# Patient Record
Sex: MALE | Race: BLACK OR AFRICAN AMERICAN | NOT HISPANIC OR LATINO | Employment: UNEMPLOYED | URBAN - METROPOLITAN AREA
[De-identification: names, ages, dates, MRNs, and addresses within clinical notes are randomized per-mention and may not be internally consistent; named-entity substitution may affect disease eponyms.]

---

## 2023-03-13 LAB
ALANINE AMINOTRANSFERASE (SGPT) (U/L) IN SER/PLAS: NORMAL U/L (ref 10–52)
ALBUMIN (G/DL) IN SER/PLAS: NORMAL G/DL (ref 3.4–5)
ALBUMIN (MG/L) IN URINE: NORMAL
ALBUMIN/CREATININE (UG/MG) IN URINE: NORMAL
ALKALINE PHOSPHATASE (U/L) IN SER/PLAS: NORMAL U/L (ref 33–136)
ANION GAP IN SER/PLAS: NORMAL MMOL/L (ref 10–20)
ASPARTATE AMINOTRANSFERASE (SGOT) (U/L) IN SER/PLAS: NORMAL U/L (ref 9–39)
BASOPHILS (10*3/UL) IN BLOOD BY AUTOMATED COUNT: NORMAL
BASOPHILS/100 LEUKOCYTES IN BLOOD BY AUTOMATED COUNT: NORMAL
BILIRUBIN TOTAL (MG/DL) IN SER/PLAS: NORMAL
CALCIUM (MG/DL) IN SER/PLAS: NORMAL MG/DL (ref 8.6–10.6)
CARBON DIOXIDE, TOTAL (MMOL/L) IN SER/PLAS: NORMAL MMOL/L (ref 21–32)
CHLORIDE (MMOL/L) IN SER/PLAS: NORMAL MMOL/L (ref 98–107)
CHOLESTEROL (MG/DL) IN SER/PLAS: NORMAL MG/DL (ref 0–199)
CHOLESTEROL IN HDL (MG/DL) IN SER/PLAS: NORMAL MG/DL
CHOLESTEROL/HDL RATIO: NORMAL
COBALAMIN (VITAMIN B12) (PG/ML) IN SER/PLAS: NORMAL PG/ML (ref 211–911)
CREATININE (MG/DL) IN SER/PLAS: NORMAL MG/DL (ref 0.5–1.3)
CREATININE (MG/DL) IN URINE: NORMAL
EOSINOPHILS (10*3/UL) IN BLOOD BY AUTOMATED COUNT: NORMAL
EOSINOPHILS/100 LEUKOCYTES IN BLOOD BY AUTOMATED COUNT: NORMAL
ERYTHROCYTE DISTRIBUTION WIDTH (RATIO) BY AUTOMATED COUNT: NORMAL % (ref 11.5–14.5)
ERYTHROCYTE MEAN CORPUSCULAR HEMOGLOBIN CONCENTRATION (G/DL) BY AUTOMATED: NORMAL G/DL (ref 32–36)
ERYTHROCYTE MEAN CORPUSCULAR VOLUME (FL) BY AUTOMATED COUNT: NORMAL FL (ref 80–100)
ERYTHROCYTES (10*6/UL) IN BLOOD BY AUTOMATED COUNT: NORMAL
ESTIMATED AVERAGE GLUCOSE FOR HBA1C: NORMAL
GFR FEMALE: NORMAL ML/MIN/1.73M2
GFR MALE: NORMAL ML/MIN/1.73M2
GLUCOSE (MG/DL) IN SER/PLAS: NORMAL
HEMATOCRIT (%) IN BLOOD BY AUTOMATED COUNT: NORMAL
HEMOGLOBIN (G/DL) IN BLOOD: NORMAL
HEMOGLOBIN A1C/HEMOGLOBIN TOTAL IN BLOOD: NORMAL
HGB A1C-DATA CONVERSION: NORMAL %
IMMATURE GRANULOCYTES/100 LEUKOCYTES IN BLOOD BY AUTOMATED COUNT: NORMAL % (ref 0–0.9)
LDL: NORMAL MG/DL (ref 0–99)
LEUKOCYTES (10*3/UL) IN BLOOD BY AUTOMATED COUNT: NORMAL
LYMPHOCYTES (10*3/UL) IN BLOOD BY AUTOMATED COUNT: NORMAL
LYMPHOCYTES/100 LEUKOCYTES IN BLOOD BY AUTOMATED COUNT: NORMAL
MANUAL DIFFERENTIAL Y/N: NORMAL
MONOCYTES (10*3/UL) IN BLOOD BY AUTOMATED COUNT: NORMAL
MONOCYTES/100 LEUKOCYTES IN BLOOD BY AUTOMATED COUNT: NORMAL
NEUTROPHILS (10*3/UL) IN BLOOD BY AUTOMATED COUNT: NORMAL
NEUTROPHILS/100 LEUKOCYTES IN BLOOD BY AUTOMATED COUNT: NORMAL
NON HDL CHOLESTEROL: NORMAL MG/DL
NRBC (PER 100 WBCS) BY AUTOMATED COUNT: NORMAL /100 WBC (ref 0–0)
PLATELETS (10*3/UL) IN BLOOD AUTOMATED COUNT: NORMAL
POTASSIUM (MMOL/L) IN SER/PLAS: NORMAL MMOL/L (ref 3.5–5.3)
PROSTATE SPECIFIC ANTIGEN,SCREEN: NORMAL NG/ML (ref 0–4)
PROTEIN TOTAL: NORMAL G/DL (ref 6.4–8.2)
SODIUM (MMOL/L) IN SER/PLAS: NORMAL MMOL/L (ref 136–145)
THYROTROPIN (MIU/L) IN SER/PLAS BY DETECTION LIMIT <= 0.05 MIU/L: NORMAL
TRIGLYCERIDE (MG/DL) IN SER/PLAS: NORMAL MG/DL (ref 0–149)
UREA NITROGEN (MG/DL) IN SER/PLAS: NORMAL
VLDL: NORMAL MG/DL (ref 0–40)

## 2023-03-18 PROBLEM — I10 UNCONTROLLED HYPERTENSION: Status: ACTIVE | Noted: 2023-03-18

## 2023-03-18 PROBLEM — F09 COGNITIVE DYSFUNCTION: Status: ACTIVE | Noted: 2023-03-18

## 2023-03-18 PROBLEM — R25.1 TREMOR: Status: ACTIVE | Noted: 2023-03-18

## 2023-03-18 PROBLEM — N18.9 CHRONIC RENAL INSUFFICIENCY: Status: ACTIVE | Noted: 2023-03-18

## 2023-03-18 PROBLEM — N40.0 ENLARGED PROSTATE: Status: ACTIVE | Noted: 2023-03-18

## 2023-03-18 PROBLEM — B35.1 MYCOTIC TOENAILS: Status: ACTIVE | Noted: 2023-03-18

## 2023-03-18 PROBLEM — K86.9 PANCREATIC DISORDER (HHS-HCC): Status: ACTIVE | Noted: 2023-03-18

## 2023-03-18 PROBLEM — M17.0 PRIMARY OSTEOARTHRITIS OF BOTH KNEES: Status: ACTIVE | Noted: 2023-03-18

## 2023-03-18 PROBLEM — M17.9 OSTEOARTHRITIS OF KNEE: Status: ACTIVE | Noted: 2023-03-18

## 2023-03-18 PROBLEM — R60.0 BILATERAL LOWER EXTREMITY EDEMA: Status: ACTIVE | Noted: 2023-03-18

## 2023-03-18 PROBLEM — R26.9 GAIT DISORDER: Status: ACTIVE | Noted: 2023-03-18

## 2023-03-18 PROBLEM — R41.3 MEMORY LOSS: Status: ACTIVE | Noted: 2023-03-18

## 2023-03-18 PROBLEM — I99.9 DECREASED CIRCULATION: Status: ACTIVE | Noted: 2023-03-18

## 2023-03-18 PROBLEM — I73.9 PVD (PERIPHERAL VASCULAR DISEASE) (CMS-HCC): Status: ACTIVE | Noted: 2023-03-18

## 2023-03-18 PROBLEM — E78.2 HYPERLIPEMIA, MIXED: Status: ACTIVE | Noted: 2023-03-18

## 2023-03-18 PROBLEM — E11.9 DIABETES MELLITUS TYPE 2, CONTROLLED (MULTI): Status: ACTIVE | Noted: 2023-03-18

## 2023-03-18 PROBLEM — R74.8 ELEVATED LIPASE: Status: ACTIVE | Noted: 2023-03-18

## 2023-03-18 PROBLEM — D64.9 CHRONIC ANEMIA: Status: ACTIVE | Noted: 2023-03-18

## 2023-03-18 PROBLEM — E78.5 DYSLIPIDEMIA: Status: ACTIVE | Noted: 2023-03-18

## 2023-03-18 PROBLEM — I10 BENIGN ESSENTIAL HYPERTENSION: Status: ACTIVE | Noted: 2023-03-18

## 2023-03-18 PROBLEM — I48.91 ATRIAL FIBRILLATION (MULTI): Status: ACTIVE | Noted: 2023-03-18

## 2023-03-18 PROBLEM — R29.898 BILATERAL LEG WEAKNESS: Status: ACTIVE | Noted: 2023-03-18

## 2023-03-18 PROBLEM — R26.2 DIFFICULTY WALKING: Status: ACTIVE | Noted: 2023-03-18

## 2023-03-18 PROBLEM — H91.93 HEARING LOSS, BILATERAL: Status: ACTIVE | Noted: 2023-03-18

## 2023-03-18 PROBLEM — R63.4 ABNORMAL WEIGHT LOSS: Status: ACTIVE | Noted: 2023-03-18

## 2023-03-18 PROBLEM — M54.50 LOW BACK PAIN: Status: ACTIVE | Noted: 2023-03-18

## 2023-03-18 RX ORDER — CHLORTHALIDONE 25 MG/1
1 TABLET ORAL EVERY MORNING
COMMUNITY
Start: 2018-11-29 | End: 2023-12-06

## 2023-03-18 RX ORDER — TAMSULOSIN HYDROCHLORIDE 0.4 MG/1
2 CAPSULE ORAL DAILY
COMMUNITY
Start: 2017-05-05 | End: 2023-07-07 | Stop reason: SDUPTHER

## 2023-03-18 RX ORDER — FUROSEMIDE 20 MG/1
1 TABLET ORAL DAILY
COMMUNITY
Start: 2020-11-10 | End: 2023-05-16 | Stop reason: SDUPTHER

## 2023-03-18 RX ORDER — BLOOD SUGAR DIAGNOSTIC
STRIP MISCELLANEOUS
COMMUNITY
Start: 2015-04-23

## 2023-03-18 RX ORDER — DILTIAZEM HYDROCHLORIDE 240 MG/1
1 CAPSULE, EXTENDED RELEASE ORAL DAILY
COMMUNITY
Start: 2017-11-09 | End: 2023-12-06 | Stop reason: SDUPTHER

## 2023-03-18 RX ORDER — POTASSIUM CHLORIDE 750 MG/1
1 TABLET, FILM COATED, EXTENDED RELEASE ORAL 2 TIMES DAILY
COMMUNITY
Start: 2022-11-14 | End: 2023-05-11 | Stop reason: SDUPTHER

## 2023-03-18 RX ORDER — METFORMIN HYDROCHLORIDE 1000 MG/1
1 TABLET ORAL 2 TIMES DAILY
COMMUNITY
Start: 2014-03-06 | End: 2023-11-11

## 2023-03-18 RX ORDER — GLIMEPIRIDE 4 MG/1
2 TABLET ORAL DAILY
COMMUNITY
Start: 2015-12-21 | End: 2023-10-07 | Stop reason: WASHOUT

## 2023-03-18 RX ORDER — ATORVASTATIN CALCIUM 40 MG/1
40 TABLET, FILM COATED ORAL NIGHTLY
COMMUNITY
Start: 2012-11-06 | End: 2023-12-06

## 2023-03-18 RX ORDER — LANCETS 33 GAUGE
EACH MISCELLANEOUS
COMMUNITY

## 2023-03-20 ENCOUNTER — TELEPHONE (OUTPATIENT)
Dept: PRIMARY CARE | Facility: CLINIC | Age: 83
End: 2023-03-20
Payer: MEDICARE

## 2023-03-20 ENCOUNTER — NURSING HOME VISIT (OUTPATIENT)
Dept: POST ACUTE CARE | Facility: EXTERNAL LOCATION | Age: 83
End: 2023-03-20
Payer: MEDICARE

## 2023-03-20 DIAGNOSIS — I5A NON-ISCHEMIC MYOCARDIAL INJURY (NON-TRAUMATIC): ICD-10-CM

## 2023-03-20 DIAGNOSIS — I10 HYPERTENSION, UNSPECIFIED TYPE: ICD-10-CM

## 2023-03-20 DIAGNOSIS — R53.81 PHYSICAL DEBILITY: Primary | ICD-10-CM

## 2023-03-20 DIAGNOSIS — N18.9 ACUTE KIDNEY INJURY SUPERIMPOSED ON CKD (CMS-HCC): ICD-10-CM

## 2023-03-20 DIAGNOSIS — G93.41 METABOLIC ENCEPHALOPATHY: ICD-10-CM

## 2023-03-20 DIAGNOSIS — E78.2 HYPERLIPEMIA, MIXED: ICD-10-CM

## 2023-03-20 DIAGNOSIS — N40.0 BENIGN PROSTATIC HYPERPLASIA, UNSPECIFIED WHETHER LOWER URINARY TRACT SYMPTOMS PRESENT: ICD-10-CM

## 2023-03-20 DIAGNOSIS — N17.9 ACUTE KIDNEY INJURY SUPERIMPOSED ON CKD (CMS-HCC): ICD-10-CM

## 2023-03-20 DIAGNOSIS — E11.29 CONTROLLED TYPE 2 DIABETES MELLITUS WITH OTHER DIABETIC KIDNEY COMPLICATION, WITHOUT LONG-TERM CURRENT USE OF INSULIN (MULTI): ICD-10-CM

## 2023-03-20 PROCEDURE — 99310 SBSQ NF CARE HIGH MDM 45: CPT | Performed by: NURSE PRACTITIONER

## 2023-03-20 NOTE — TELEPHONE ENCOUNTER
----- Message from Lala Pratt MD sent at 3/19/2023 11:59 PM EDT -----  The patient is due for a posthospitalization follow-up visit.  Please schedule the appointment.

## 2023-03-20 NOTE — LETTER
Patient: Betito Chahal  : 1940    Encounter Date: 2023    Subjective   Patient ID: Betito Chahal is a 83 y.o. male who is acute skilled care and presents for initial visit for skilled nursing.    HPI   A 83 year old Male with past medical history of dementia, Paroxysmal A-fib,hypertension, diastolic dysfunction, carotid stenosis, diabetes type 2, hyperlipidemia who was hospitalization due to altered mental status and fall. His worked up; Lactate as high as 6; troponin as high as 200 but this was felt likely 2/2 the RVR ; . Afib RVR HR in 140s. CXR with possible lower infiltrates and was treated with Lasix. He was given IV cardizem and IV fluids and HR and lactate improved.  During hospitalization; he found to have some dementia and confused required sitter. However prior discharged to SNF, he was sitter free for >24 hours and was pleasant. He discharged to  for rehab.     Family History:Family History: reviewed and not pertinent to presenting problem  Social History:Alcohol Use: denies, Drug Use:   denies       Allergies:·      No Known Allergies:    Review of Systems  A 10 point ROS was performed with the patient denying any complaints at this  time aside from those listed in the HPI above.  Objective   Vitals: T 98.0 - 3/20/2023 14:38 Route: Forehead (non-contact)  /80 - 3/20/2023 14:38 Position: Sitting r/arm  P 68 - 3/20/2023 14:38 Pulse Type: Regular  Character: Normal.  R 18 - 3/20/2023 14:38  O2 98 %- 3/20/2023 14:38 Method: Room Air      W 187.2 lb - 3/20/2023 11:33 Scale: Sitting    Physical Exam  Constitutional:       General: He is not in acute distress.     Appearance: Normal appearance. He is not ill-appearing.   HENT:      Head: Normocephalic and atraumatic.      Nose: Nose normal.      Mouth/Throat:      Mouth: Mucous membranes are moist.      Pharynx: Oropharynx is clear.   Eyes:      General: No scleral icterus.     Conjunctiva/sclera: Conjunctivae normal.      Pupils: Pupils  are equal, round, and reactive to light.   Cardiovascular:      Rate and Rhythm: Normal rate and regular rhythm.      Heart sounds: Normal heart sounds.   Pulmonary:      Effort: Pulmonary effort is normal. No respiratory distress.      Breath sounds: Normal breath sounds. No wheezing, rhonchi or rales.   Abdominal:      General: Abdomen is flat. There is no distension.      Palpations: Abdomen is soft.      Tenderness: There is no abdominal tenderness. There is no rebound.   Musculoskeletal:         General: No swelling. Normal range of motion.      Cervical back: Neck supple. No tenderness.   Skin:     General: Skin is warm and dry.   Neurological:      Mental Status: He is alert and oriented to person, place, and time. Mental status is at baseline.   Psychiatric:         Mood and Affect: Mood normal.         Behavior: Behavior normal.     Lab 3/20/23    CBC       White Blood Cell Count  6.58 k/uL 3.70-11.00  Final           RBC  4.70 m/uL 4.20-6.00  Final           Hemoglobin  11.3 g/dL 13.0-17.0 L Final           Hematocrit  37.7 % 39.0-51.0 L Final           MCV  80.2 fL 80.0-100.0  Final           MCH  24.0 pg 26.0-34.0 L Final           MCHC  30.0 g/dL 30.5-36.0 L Final           RDW-CV  18.7 % 11.5-15.0 H Final           Platelet Count  407 k/uL 150-400 H Final           MPV  11.5 fL 9.0-12.7  Final           Absolute nRBC  <0.01 k/uL <0.01  Final       Basic Metabolic Panl       Glucose  231 mg/dL 74-99 H Final      The American Diabetes Association (ADA) provides guidance for cutoff values for fasting glucose and random glucose. The ADA defines fasting as no caloric intake for at least 8 hours. Fasting plasma glucose results between 100 to 125 mg/dL indicate increased risk for diabetes (prediabetes).  Fasting plasma glucose results greater than or equal to 126 mg/dL meet the criteria for diagnosis of diabetes. In the absence of unequivocal hyperglycemia, results should be confirmed by repeat testing. In a  patient with classic symptoms of hyperglycemia or hyperglycemic crisis, random plasma glucose results greater than or equal to 200 mg/dL meet the criteria for diagnosis of diabetes.  Reference: Standards of Medical Care in Diabetes 2016, American Diabetes Association. Diabetes Care. 2016.39(Suppl 1).       BUN  30 mg/dL 9-24 H Final           Creatinine  1.95 mg/dL 0.73-1.22 H Final           Sodium  137 mmol/L 136-144  Final           Potassium  4.2 mmol/L 3.7-5.1  Final           Chloride  99 mmol/L   Final           CO2  23 mmol/L 22-30  Final           Anion Gap  15 mmol/L 9-18  Final           Calcium, Total  9.4 mg/dL 8.5-10.2  Final           Estimated Glomerular Filtration Rate  34 mL/min/1.73 meters squared >=60 L Final      Estimated Glomerular Filtration Rate (eGFR) is calculated using the 2021 CKD-EPI creatinine equation. This equation utilizes serum creatinine, sex, and age as parameters. The creatinine assay has traceable calibration to isotope dilution-mass spectrometry. Refer to KDIGO guidelines for clinical interpretation. In patients with unstable renal function, e.g. those with acute kidney injury, the eGFR may not accurately reflect actual GFR.  Assessment/Plan   #Physical debility  - Continue PT/OT   # Metabolic encephalopathy; Mental status improves  - mild Dementia with delirium; mental status improves   -Likely secondary to infection  -Continue encourage PO intake  - Fall precaution  # Sepsis; resolved   # Pneumonia; resolved,   -Chest x-ray consistent with right lower lobe pneumonia  -He was treated with ceftriaxone and azithromycin  -Monitor weekly cbc   # Hyperglycemia  # DM type 2-Blood glucose elevated likely due to sepsis  -resumed metformin 1000 mg PO BID  -C/W Glimepiride Oral Tablet 8 MG PO daily   - c/w SITagliptin 100 mg PO daily   -Diabetic diet  -Will start Insulin sliding scale and Accu-Cheks  # Non-ischemic myocardial injury  - Elevated troponins  -EKG with no acute  ischemic changes  -Troponin elevation likely from demand ischemia  -Trend troponins until downtrend  # SILVIA on CKD stage 3   -Cr 1.68 improved after IV fluids  - Current CR elevated 1.95 with BUN 34 on 3/20/23  - Will encourage PO fluid for now given hx of fluid overloaded   # A fib  -continue home meds;  dilTIAZem HCl ER  240 MG PO daily  - c/w eliquis 5 mg PO BID  - Continue monitor HR/BP   #HTN  - c/w Furosemide 40 mg PO daily   - Continue monitor weekly BMP   # HLD  -continue Atorvastatin 40 mg Po daily   #BPH  - c/w Flomax 0.8 mg PO daily   #Med reviewed  #Labs reviewed    Time spending for 45 minutes   -reviewed of hospital record via EMR and reconciled medication in [facility] and EMR (d/c summary). Reviewed orders, medications, records, and pertinent labs/x-rays from [facility]. Reviewed VS, BS, O2, etc as per protocol. Reviewed and signed off orders, medications, Labs, x-rays, and current diagnoses in [facility]  -Obtained history  -Performing physical examination  -Counseling and educating patient   -Ordering medications, lab   -Documenting clinical information in the [facility]  -Care coordinating with nursing staff;      Electronically Signed By: LILLIAN Sweeney-CNP   3/21/23  1:04 PM

## 2023-03-21 PROBLEM — N17.9 ACUTE KIDNEY INJURY SUPERIMPOSED ON CKD (CMS-HCC): Status: ACTIVE | Noted: 2023-03-21

## 2023-03-21 PROBLEM — G93.41 METABOLIC ENCEPHALOPATHY: Status: ACTIVE | Noted: 2023-03-21

## 2023-03-21 PROBLEM — R53.81 PHYSICAL DEBILITY: Status: ACTIVE | Noted: 2023-03-21

## 2023-03-21 PROBLEM — I5A NON-ISCHEMIC MYOCARDIAL INJURY (NON-TRAUMATIC): Status: ACTIVE | Noted: 2023-03-21

## 2023-03-21 PROBLEM — N18.9 ACUTE KIDNEY INJURY SUPERIMPOSED ON CKD (CMS-HCC): Status: ACTIVE | Noted: 2023-03-21

## 2023-03-21 NOTE — PROGRESS NOTES
Subjective   Patient ID: Betito Chahal is a 83 y.o. male who is acute skilled care and presents for initial visit for skilled nursing.    HPI   A 83 year old Male with past medical history of dementia, Paroxysmal A-fib,hypertension, diastolic dysfunction, carotid stenosis, diabetes type 2, hyperlipidemia who was hospitalization due to altered mental status and fall. His worked up; Lactate as high as 6; troponin as high as 200 but this was felt likely 2/2 the RVR ; . Afib RVR HR in 140s. CXR with possible lower infiltrates and was treated with Lasix. He was given IV cardizem and IV fluids and HR and lactate improved.  During hospitalization; he found to have some dementia and confused required sitter. However prior discharged to SNF, he was sitter free for >24 hours and was pleasant. He discharged to  for rehab.     Family History:Family History: reviewed and not pertinent to presenting problem  Social History:Alcohol Use: denies, Drug Use:   denies       Allergies:·      No Known Allergies:    Review of Systems  A 10 point ROS was performed with the patient denying any complaints at this  time aside from those listed in the HPI above.  Objective   Vitals: T 98.0 - 3/20/2023 14:38 Route: Forehead (non-contact)  /80 - 3/20/2023 14:38 Position: Sitting r/arm  P 68 - 3/20/2023 14:38 Pulse Type: Regular  Character: Normal.  R 18 - 3/20/2023 14:38  O2 98 %- 3/20/2023 14:38 Method: Room Air      W 187.2 lb - 3/20/2023 11:33 Scale: Sitting    Physical Exam  Constitutional:       General: He is not in acute distress.     Appearance: Normal appearance. He is not ill-appearing.   HENT:      Head: Normocephalic and atraumatic.      Nose: Nose normal.      Mouth/Throat:      Mouth: Mucous membranes are moist.      Pharynx: Oropharynx is clear.   Eyes:      General: No scleral icterus.     Conjunctiva/sclera: Conjunctivae normal.      Pupils: Pupils are equal, round, and reactive to light.   Cardiovascular:      Rate  and Rhythm: Normal rate and regular rhythm.      Heart sounds: Normal heart sounds.   Pulmonary:      Effort: Pulmonary effort is normal. No respiratory distress.      Breath sounds: Normal breath sounds. No wheezing, rhonchi or rales.   Abdominal:      General: Abdomen is flat. There is no distension.      Palpations: Abdomen is soft.      Tenderness: There is no abdominal tenderness. There is no rebound.   Musculoskeletal:         General: No swelling. Normal range of motion.      Cervical back: Neck supple. No tenderness.   Skin:     General: Skin is warm and dry.   Neurological:      Mental Status: He is alert and oriented to person, place, and time. Mental status is at baseline.   Psychiatric:         Mood and Affect: Mood normal.         Behavior: Behavior normal.     Lab 3/20/23    CBC       White Blood Cell Count  6.58 k/uL 3.70-11.00  Final           RBC  4.70 m/uL 4.20-6.00  Final           Hemoglobin  11.3 g/dL 13.0-17.0 L Final           Hematocrit  37.7 % 39.0-51.0 L Final           MCV  80.2 fL 80.0-100.0  Final           MCH  24.0 pg 26.0-34.0 L Final           MCHC  30.0 g/dL 30.5-36.0 L Final           RDW-CV  18.7 % 11.5-15.0 H Final           Platelet Count  407 k/uL 150-400 H Final           MPV  11.5 fL 9.0-12.7  Final           Absolute nRBC  <0.01 k/uL <0.01  Final       Basic Metabolic Panl       Glucose  231 mg/dL 74-99 H Final      The American Diabetes Association (ADA) provides guidance for cutoff values for fasting glucose and random glucose. The ADA defines fasting as no caloric intake for at least 8 hours. Fasting plasma glucose results between 100 to 125 mg/dL indicate increased risk for diabetes (prediabetes).  Fasting plasma glucose results greater than or equal to 126 mg/dL meet the criteria for diagnosis of diabetes. In the absence of unequivocal hyperglycemia, results should be confirmed by repeat testing. In a patient with classic symptoms of hyperglycemia or hyperglycemic  crisis, random plasma glucose results greater than or equal to 200 mg/dL meet the criteria for diagnosis of diabetes.  Reference: Standards of Medical Care in Diabetes 2016, American Diabetes Association. Diabetes Care. 2016.39(Suppl 1).       BUN  30 mg/dL 9-24 H Final           Creatinine  1.95 mg/dL 0.73-1.22 H Final           Sodium  137 mmol/L 136-144  Final           Potassium  4.2 mmol/L 3.7-5.1  Final           Chloride  99 mmol/L   Final           CO2  23 mmol/L 22-30  Final           Anion Gap  15 mmol/L 9-18  Final           Calcium, Total  9.4 mg/dL 8.5-10.2  Final           Estimated Glomerular Filtration Rate  34 mL/min/1.73 meters squared >=60 L Final      Estimated Glomerular Filtration Rate (eGFR) is calculated using the 2021 CKD-EPI creatinine equation. This equation utilizes serum creatinine, sex, and age as parameters. The creatinine assay has traceable calibration to isotope dilution-mass spectrometry. Refer to KDIGO guidelines for clinical interpretation. In patients with unstable renal function, e.g. those with acute kidney injury, the eGFR may not accurately reflect actual GFR.  Assessment/Plan   #Physical debility  - Continue PT/OT   # Metabolic encephalopathy; Mental status improves  - mild Dementia with delirium; mental status improves   -Likely secondary to infection  -Continue encourage PO intake  - Fall precaution  # Sepsis; resolved   # Pneumonia; resolved,   -Chest x-ray consistent with right lower lobe pneumonia  -He was treated with ceftriaxone and azithromycin  -Monitor weekly cbc   # Hyperglycemia  # DM type 2-Blood glucose elevated likely due to sepsis  -resumed metformin 1000 mg PO BID  -C/W Glimepiride Oral Tablet 8 MG PO daily   - c/w SITagliptin 100 mg PO daily   -Diabetic diet  -Will start Insulin sliding scale and Accu-Cheks  # Non-ischemic myocardial injury  - Elevated troponins  -EKG with no acute ischemic changes  -Troponin elevation likely from demand  ischemia  -Trend troponins until downtrend  # SILVIA on CKD stage 3   -Cr 1.68 improved after IV fluids  - Current CR elevated 1.95 with BUN 34 on 3/20/23  - Will encourage PO fluid for now given hx of fluid overloaded   # A fib  -continue home meds;  dilTIAZem HCl ER  240 MG PO daily  - c/w eliquis 5 mg PO BID  - Continue monitor HR/BP   #HTN  - c/w Furosemide 40 mg PO daily   - Continue monitor weekly BMP   # HLD  -continue Atorvastatin 40 mg Po daily   #BPH  - c/w Flomax 0.8 mg PO daily   #Med reviewed  #Labs reviewed    Time spending for 45 minutes   -reviewed of hospital record via EMR and reconciled medication in [facility] and EMR (d/c summary). Reviewed orders, medications, records, and pertinent labs/x-rays from [facility]. Reviewed VS, BS, O2, etc as per protocol. Reviewed and signed off orders, medications, Labs, x-rays, and current diagnoses in [facility]  -Obtained history  -Performing physical examination  -Counseling and educating patient   -Ordering medications, lab   -Documenting clinical information in the [facility]  -Care coordinating with nursing staff;

## 2023-03-23 ENCOUNTER — NURSING HOME VISIT (OUTPATIENT)
Dept: POST ACUTE CARE | Facility: EXTERNAL LOCATION | Age: 83
End: 2023-03-23
Payer: MEDICARE

## 2023-03-23 DIAGNOSIS — R53.81 PHYSICAL DEBILITY: ICD-10-CM

## 2023-03-23 DIAGNOSIS — E16.2 HYPOGLYCEMIA: Primary | ICD-10-CM

## 2023-03-23 PROCEDURE — 99308 SBSQ NF CARE LOW MDM 20: CPT | Performed by: NURSE PRACTITIONER

## 2023-03-23 NOTE — LETTER
Patient: Betito Chahal  : 1940    Encounter Date: 2023    Subjective  Patient ID: Betito Chahal is a 83 y.o. male who is acute skilled care being seen and evaluated for multiple medical problems.    HPI   A 83 year old Male with past medical history of dementia, Paroxysmal A-fib,hypertension, diastolic dysfunction, carotid stenosis, diabetes type 2, hyperlipidemia who was hospitalization due to altered mental status and fall. His worked up; Lactate as high as 6; troponin as high as 200 but this was felt likely 2/2 the RVR ; . Afib RVR HR in 140s. CXR with possible lower infiltrates and was treated with Lasix. He was given IV cardizem and IV fluids and HR and lactate improved.  During hospitalization; he found to have some dementia and confused required sitter. However prior discharged to SNF, he was sitter free for >24 hours and was pleasant. He discharged to  for rehab. Mental status significantly improving. Doing better with therapy. Lab on 3/23/23 identified FBS 38 but Accucheck showed 181 and 198. Humalog SSI was discontinued. Patient denies hypoglycemia symptoms.  Denies chest pain, SOB, dizziness, F/C/S/N/V, constipation    Objective  Vitals: T 98.2 - 3/23/2023 19:52 Route: Forehead (non-contact)  /70 - 3/23/2023 19:52 Position: Sitting l/arm  P 67 - 3/23/2023 19:52 Pulse Type: Regular  Character: Normal.  R 18 - 3/23/2023 19:52  O2 97 %- 3/23/2023 19:52 Method: Room Air  .0 - 3/23/2023 17:43  W 201.6 lb - 3/23/2023 06:48 Scale: Sitting  Physical Exam  Constitutional:       General: He is not in acute distress.     Appearance: Normal appearance.   Eyes:      General: No scleral icterus.     Conjunctiva/sclera: Conjunctivae normal.   Cardiovascular:      Rate and Rhythm: Normal rate and regular rhythm.      Pulses: Normal pulses.      Heart sounds: Normal heart sounds. No murmur heard.  Pulmonary:      Effort: Pulmonary effort is normal.      Breath sounds: Normal breath sounds. No  wheezing or rhonchi.   Musculoskeletal:         General: Normal range of motion.   Neurological:      Mental Status: He is alert. Mental status is at baseline.   Psychiatric:         Mood and Affect: Mood normal.         Behavior: Behavior normal.     Assessment/Plan  # Hypoglycemia; asymptomatic   # DM type 2  -c/w metformin 1000 mg PO BID  -C/W Glimepiride Oral Tablet 8 MG PO daily   - c/w SITagliptin 100 mg PO daily   -Diabetic diet  -d/c Insulin sliding scale and Accu-Cheks  #Physical debility  - continue PT/OT      Electronically Signed By: LILLIAN Sweeney-CNP   3/31/23 10:00 AM

## 2023-03-24 ENCOUNTER — APPOINTMENT (OUTPATIENT)
Dept: PRIMARY CARE | Facility: CLINIC | Age: 83
End: 2023-03-24
Payer: MEDICARE

## 2023-03-27 ENCOUNTER — TELEPHONE (OUTPATIENT)
Dept: PRIMARY CARE | Facility: CLINIC | Age: 83
End: 2023-03-27
Payer: MEDICARE

## 2023-03-27 NOTE — TELEPHONE ENCOUNTER
PT CALLED WANTS TO IF YOU CAN REFER SOME DOCTOR THAT CAN COME TO HIS HOUSE INSTEAD OF HIM GOING OUT THE HOUSE.

## 2023-03-28 ENCOUNTER — APPOINTMENT (OUTPATIENT)
Dept: PRIMARY CARE | Facility: CLINIC | Age: 83
End: 2023-03-28
Payer: MEDICARE

## 2023-03-30 ENCOUNTER — NURSING HOME VISIT (OUTPATIENT)
Dept: POST ACUTE CARE | Facility: EXTERNAL LOCATION | Age: 83
End: 2023-03-30
Payer: MEDICARE

## 2023-03-30 DIAGNOSIS — N18.30 CHRONIC RENAL IMPAIRMENT, STAGE 3 (MODERATE), UNSPECIFIED WHETHER STAGE 3A OR 3B CKD (MULTI): ICD-10-CM

## 2023-03-30 DIAGNOSIS — E11.29 CONTROLLED TYPE 2 DIABETES MELLITUS WITH OTHER DIABETIC KIDNEY COMPLICATION, WITHOUT LONG-TERM CURRENT USE OF INSULIN (MULTI): ICD-10-CM

## 2023-03-30 DIAGNOSIS — I10 BENIGN ESSENTIAL HYPERTENSION: ICD-10-CM

## 2023-03-30 DIAGNOSIS — R53.81 PHYSICAL DEBILITY: Primary | ICD-10-CM

## 2023-03-30 DIAGNOSIS — Z75.8 DISCHARGE PLANNING ISSUES: ICD-10-CM

## 2023-03-30 DIAGNOSIS — I48.91 ATRIAL FIBRILLATION, UNSPECIFIED TYPE (MULTI): ICD-10-CM

## 2023-03-30 PROCEDURE — 99315 NF DSCHRG MGMT 30 MIN/LESS: CPT | Performed by: NURSE PRACTITIONER

## 2023-03-30 NOTE — TELEPHONE ENCOUNTER
BELEM GEE [facility] CALLED AND WANTS TO KNOW IF YOU WANT TO CONTINUE THE PT OT AND ST WHEN PATIENT COMES HOME.

## 2023-03-30 NOTE — LETTER
Patient: Betito Chahal  : 1940    Encounter Date: 2023    Subjective  Patient ID: Betito Chahal is a 83 y.o. male who is acute skilled care being seen and evaluated for multiple medical problems. Discharge planing.    HPI   A 83 year old Male with past medical history of dementia, Paroxysmal A-fib,hypertension, diastolic dysfunction, carotid stenosis, diabetes type 2, hyperlipidemia who was hospitalization due to altered mental status and fall. His worked up; Lactate as high as 6; troponin as high as 200 but this was felt likely 2/2 the RVR ; . Afib RVR HR in 140s. CXR with possible lower infiltrates and was treated with Lasix. He was given IV cardizem and IV fluids and HR and lactate improved.  During hospitalization; he found to have some dementia and confused required sitter. However prior discharged to SNF, he was sitter free for >24 hours and was pleasant. He discharged to  for rehab. During rehab course his hemodynamic stable, mental status significantly improving. Tolerate PO intake. Blood glucose fairly controlled with PO antidiabetic. Denies F/C/S/N/V, constipation.      Review of Systems  A 10 point ROS was performed with the patient denying any complaints at this  time aside from those listed in the HPI above.  Objective  Vitals: T 98.5 - 3/30/2023 18:32 Route: Forehead (non-contact)  /66 - 3/30/2023 18:32 Position: Sitting l/arm  P 77 - 3/30/2023 18:32 Pulse Type: Regular  Character: Normal.  R 18 - 3/30/2023 18:32  O2 97 %- 3/30/2023 18:32 Method: Room Air  .0 - 3/30/2023 16:57    Physical exam  Constitutional: awake, afebrile, not in acute distress  Eyes: clear sclera  ENMT: mucous membranes moist  Head/Neck: neck supple, trachea midline, no JVD  Respiratory/Thorax: Non labored breathing   Cardiovascular: RRR, no rubs/murmurs/gallops  Gastrointestinal: +BS x4, soft, nt/nd/ng/nr  Musculoskeletal: ROM WNL, normal strength   Extremities: no edema, no cellulitis  Neurological:  A&O x2, person and place   Psychological: Appropriate mood and behavior  Skin: warm and dry, intact     Lab 3/30/23   CBC       White Blood Cell Count  10.43 k/uL 3.70-11.00  Final           RBC  4.26 m/uL 4.20-6.00  Final           Hemoglobin  10.5 g/dL 13.0-17.0 L Final           Hematocrit  33.8 % 39.0-51.0 L Final           MCV  79.3 fL 80.0-100.0 L Final           MCH  24.6 pg 26.0-34.0 L Final           MCHC  31.1 g/dL 30.5-36.0  Final           RDW-CV  18.8 % 11.5-15.0 H Final           Platelet Count  416 k/uL 150-400 H Final           MPV  10.8 fL 9.0-12.7  Final           Absolute nRBC  <0.01 k/uL <0.01  Final       Basic Metabolic Panl       Glucose  145 mg/dL 74-99 H Final      The American Diabetes Association (ADA) provides guidance for cutoff values for fasting glucose and random glucose. The ADA defines fasting as no caloric intake for at least 8 hours. Fasting plasma glucose results between 100 to 125 mg/dL indicate increased risk for diabetes (prediabetes).  Fasting plasma glucose results greater than or equal to 126 mg/dL meet the criteria for diagnosis of diabetes. In the absence of unequivocal hyperglycemia, results should be confirmed by repeat testing. In a patient with classic symptoms of hyperglycemia or hyperglycemic crisis, random plasma glucose results greater than or equal to 200 mg/dL meet the criteria for diagnosis of diabetes.  Reference: Standards of Medical Care in Diabetes 2016, American Diabetes Association. Diabetes Care. 2016.39(Suppl 1).       BUN  25 mg/dL 9-24 H Final           Creatinine  1.85 mg/dL 0.73-1.22 H Final           Sodium  137 mmol/L 136-144  Final           Potassium  4.6 mmol/L 3.7-5.1  Final           Chloride  99 mmol/L   Final           CO2  25 mmol/L 22-30  Final           Anion Gap  13 mmol/L 9-18  Final           Calcium, Total  9.4 mg/dL 8.5-10.2  Final           Estimated Glomerular Filtration Rate  36 mL/min/1.73 meters squared >=60 L Final       Estimated Glomerular Filtration Rate (eGFR) is calculated using the 2021 CKD-EPI creatinine equation. This equation utilizes serum creatinine, sex, and age as parameters. The creatinine assay has traceable calibration to isotope dilution-mass spectrometry. Refer to KDIGO guidelines for clinical interpretation. In patients with unstable renal function, e.g. those with acute kidney injury, the eGFR may not accurately reflect actual GFR.  Assessment/Plan  #Physical debility; doing better   - Continue PT/OT   # Metabolic encephalopathy; resolved   - mild Dementia with delirium; mental status improves   -Likely secondary to infection  -Continue encourage PO intake  - Fall precaution  # Sepsis; resolved   # Pneumonia; resolved,   -Chest x-ray consistent with right lower lobe pneumonia  -He was treated with ceftriaxone and azithromycin  -Monitor weekly cbc   # DM type 2-Blood glucose elevated likely due to sepsis  -resumed metformin 1000 mg PO BID  -C/W Glimepiride Oral Tablet 8 MG PO daily   - c/w SITagliptin 100 mg PO daily   -Diabetic diet  -Will start Insulin sliding scale and Accu-Cheks  # Non-ischemic myocardial injury  - Elevated troponins  -EKG with no acute ischemic changes  -Troponin elevation likely from demand ischemia  -Trend troponins until downtrend  # CKD stage 3   -Cr stable; new baseline 1.6   - continue encourage PO fluid   # A fib  -continue home meds;  dilTIAZem HCl ER  240 MG PO daily  - c/w eliquis 5 mg PO BID  - Continue monitor HR/BP   #HTN; BP fairly controlled   - c/w Furosemide 40 mg PO daily   - Continue monitor weekly BMP   # HLD  -continue Atorvastatin 40 mg Po daily   #BPH  - c/w Flomax 0.8 mg PO daily   #Discharge planing issues   - Reviewed medications and educated patient's wife for reason of taking medication.   -  Explained the need for follow-up with specialist and also follow-up with community providers/PCP.  - Discussed with SW; patient requires a home visiting RN for monitoring  blood sugar, finally patient would benefit from home PT/OT to improve muscle strengthening & to establish a home exercise program due to deconditioning  #Med reviewed  #Labs reviewed    Time spending for discharge < 30 minutes       Electronically Signed By: FARZANA Sweeney   3/31/23 10:29 AM

## 2023-03-31 ENCOUNTER — NURSING HOME VISIT (OUTPATIENT)
Dept: POST ACUTE CARE | Facility: EXTERNAL LOCATION | Age: 83
End: 2023-03-31
Payer: MEDICARE

## 2023-03-31 DIAGNOSIS — R63.4 ABNORMAL WEIGHT LOSS: ICD-10-CM

## 2023-03-31 DIAGNOSIS — I5A NON-ISCHEMIC MYOCARDIAL INJURY (NON-TRAUMATIC): ICD-10-CM

## 2023-03-31 DIAGNOSIS — G93.41 METABOLIC ENCEPHALOPATHY: ICD-10-CM

## 2023-03-31 DIAGNOSIS — R29.898 BILATERAL LEG WEAKNESS: Primary | ICD-10-CM

## 2023-03-31 DIAGNOSIS — R53.81 PHYSICAL DEBILITY: ICD-10-CM

## 2023-03-31 DIAGNOSIS — N18.9 ACUTE KIDNEY INJURY SUPERIMPOSED ON CKD (CMS-HCC): ICD-10-CM

## 2023-03-31 DIAGNOSIS — N17.9 ACUTE KIDNEY INJURY SUPERIMPOSED ON CKD (CMS-HCC): ICD-10-CM

## 2023-03-31 DIAGNOSIS — J18.9 PNEUMONIA DUE TO INFECTIOUS ORGANISM, UNSPECIFIED LATERALITY, UNSPECIFIED PART OF LUNG: ICD-10-CM

## 2023-03-31 DIAGNOSIS — A41.9 SEPSIS, DUE TO UNSPECIFIED ORGANISM, UNSPECIFIED WHETHER ACUTE ORGAN DYSFUNCTION PRESENT (MULTI): ICD-10-CM

## 2023-03-31 DIAGNOSIS — F09 COGNITIVE DYSFUNCTION: ICD-10-CM

## 2023-03-31 DIAGNOSIS — I48.91 ATRIAL FIBRILLATION, UNSPECIFIED TYPE (MULTI): ICD-10-CM

## 2023-03-31 DIAGNOSIS — E11.29 CONTROLLED TYPE 2 DIABETES MELLITUS WITH OTHER DIABETIC KIDNEY COMPLICATION, WITHOUT LONG-TERM CURRENT USE OF INSULIN (MULTI): ICD-10-CM

## 2023-03-31 DIAGNOSIS — I10 BENIGN ESSENTIAL HYPERTENSION: ICD-10-CM

## 2023-03-31 PROCEDURE — 99309 SBSQ NF CARE MODERATE MDM 30: CPT | Performed by: FAMILY MEDICINE

## 2023-03-31 NOTE — PROGRESS NOTES
Subjective   Patient ID: Betito Chahal is a 83 y.o. male who is acute skilled care being seen and evaluated for multiple medical problems.    HPI   A 83 year old Male with past medical history of dementia, Paroxysmal A-fib,hypertension, diastolic dysfunction, carotid stenosis, diabetes type 2, hyperlipidemia who was hospitalization due to altered mental status and fall. His worked up; Lactate as high as 6; troponin as high as 200 but this was felt likely 2/2 the RVR ; . Afib RVR HR in 140s. CXR with possible lower infiltrates and was treated with Lasix. He was given IV cardizem and IV fluids and HR and lactate improved.  During hospitalization; he found to have some dementia and confused required sitter. However prior discharged to SNF, he was sitter free for >24 hours and was pleasant. He discharged to  for rehab. Mental status significantly improving. Doing better with therapy. Lab on 3/23/23 identified FBS 38 but Accucheck showed 181 and 198. Humalog SSI was discontinued. Patient denies hypoglycemia symptoms.  Denies chest pain, SOB, dizziness, F/C/S/N/V, constipation    Objective   Vitals: T 98.2 - 3/23/2023 19:52 Route: Forehead (non-contact)  /70 - 3/23/2023 19:52 Position: Sitting l/arm  P 67 - 3/23/2023 19:52 Pulse Type: Regular  Character: Normal.  R 18 - 3/23/2023 19:52  O2 97 %- 3/23/2023 19:52 Method: Room Air  .0 - 3/23/2023 17:43  W 201.6 lb - 3/23/2023 06:48 Scale: Sitting  Physical Exam  Constitutional:       General: He is not in acute distress.     Appearance: Normal appearance.   Eyes:      General: No scleral icterus.     Conjunctiva/sclera: Conjunctivae normal.   Cardiovascular:      Rate and Rhythm: Normal rate and regular rhythm.      Pulses: Normal pulses.      Heart sounds: Normal heart sounds. No murmur heard.  Pulmonary:      Effort: Pulmonary effort is normal.      Breath sounds: Normal breath sounds. No wheezing or rhonchi.   Musculoskeletal:         General: Normal  range of motion.   Neurological:      Mental Status: He is alert. Mental status is at baseline.   Psychiatric:         Mood and Affect: Mood normal.         Behavior: Behavior normal.     Assessment/Plan   # Hypoglycemia; asymptomatic   # DM type 2  -c/w metformin 1000 mg PO BID  -C/W Glimepiride Oral Tablet 8 MG PO daily   - c/w SITagliptin 100 mg PO daily   -Diabetic diet  -d/c Insulin sliding scale and Accu-Cheks  #Physical debility  - continue PT/OT

## 2023-03-31 NOTE — PROGRESS NOTES
Subjective   Patient ID: Betito Chahal is a 83 y.o. male who is acute skilled care being seen and evaluated for multiple medical problems. Discharge planing.    HPI   A 83 year old Male with past medical history of dementia, Paroxysmal A-fib,hypertension, diastolic dysfunction, carotid stenosis, diabetes type 2, hyperlipidemia who was hospitalization due to altered mental status and fall. His worked up; Lactate as high as 6; troponin as high as 200 but this was felt likely 2/2 the RVR ; . Afib RVR HR in 140s. CXR with possible lower infiltrates and was treated with Lasix. He was given IV cardizem and IV fluids and HR and lactate improved.  During hospitalization; he found to have some dementia and confused required sitter. However prior discharged to SNF, he was sitter free for >24 hours and was pleasant. He discharged to  for rehab. During rehab course his hemodynamic stable, mental status significantly improving. Tolerate PO intake. Blood glucose fairly controlled with PO antidiabetic. Denies F/C/S/N/V, constipation.      Review of Systems  A 10 point ROS was performed with the patient denying any complaints at this  time aside from those listed in the HPI above.  Objective   Vitals: T 98.5 - 3/30/2023 18:32 Route: Forehead (non-contact)  /66 - 3/30/2023 18:32 Position: Sitting l/arm  P 77 - 3/30/2023 18:32 Pulse Type: Regular  Character: Normal.  R 18 - 3/30/2023 18:32  O2 97 %- 3/30/2023 18:32 Method: Room Air  .0 - 3/30/2023 16:57    Physical exam  Constitutional: awake, afebrile, not in acute distress  Eyes: clear sclera  ENMT: mucous membranes moist  Head/Neck: neck supple, trachea midline, no JVD  Respiratory/Thorax: Non labored breathing   Cardiovascular: RRR, no rubs/murmurs/gallops  Gastrointestinal: +BS x4, soft, nt/nd/ng/nr  Musculoskeletal: ROM WNL, normal strength   Extremities: no edema, no cellulitis  Neurological: A&O x2, person and place   Psychological: Appropriate mood and  behavior  Skin: warm and dry, intact     Lab 3/30/23   CBC       White Blood Cell Count  10.43 k/uL 3.70-11.00  Final           RBC  4.26 m/uL 4.20-6.00  Final           Hemoglobin  10.5 g/dL 13.0-17.0 L Final           Hematocrit  33.8 % 39.0-51.0 L Final           MCV  79.3 fL 80.0-100.0 L Final           MCH  24.6 pg 26.0-34.0 L Final           MCHC  31.1 g/dL 30.5-36.0  Final           RDW-CV  18.8 % 11.5-15.0 H Final           Platelet Count  416 k/uL 150-400 H Final           MPV  10.8 fL 9.0-12.7  Final           Absolute nRBC  <0.01 k/uL <0.01  Final       Basic Metabolic Panl       Glucose  145 mg/dL 74-99 H Final      The American Diabetes Association (ADA) provides guidance for cutoff values for fasting glucose and random glucose. The ADA defines fasting as no caloric intake for at least 8 hours. Fasting plasma glucose results between 100 to 125 mg/dL indicate increased risk for diabetes (prediabetes).  Fasting plasma glucose results greater than or equal to 126 mg/dL meet the criteria for diagnosis of diabetes. In the absence of unequivocal hyperglycemia, results should be confirmed by repeat testing. In a patient with classic symptoms of hyperglycemia or hyperglycemic crisis, random plasma glucose results greater than or equal to 200 mg/dL meet the criteria for diagnosis of diabetes.  Reference: Standards of Medical Care in Diabetes 2016, American Diabetes Association. Diabetes Care. 2016.39(Suppl 1).       BUN  25 mg/dL 9-24 H Final           Creatinine  1.85 mg/dL 0.73-1.22 H Final           Sodium  137 mmol/L 136-144  Final           Potassium  4.6 mmol/L 3.7-5.1  Final           Chloride  99 mmol/L   Final           CO2  25 mmol/L 22-30  Final           Anion Gap  13 mmol/L 9-18  Final           Calcium, Total  9.4 mg/dL 8.5-10.2  Final           Estimated Glomerular Filtration Rate  36 mL/min/1.73 meters squared >=60 L Final      Estimated Glomerular Filtration Rate (eGFR) is calculated using  the 2021 CKD-EPI creatinine equation. This equation utilizes serum creatinine, sex, and age as parameters. The creatinine assay has traceable calibration to isotope dilution-mass spectrometry. Refer to KDIGO guidelines for clinical interpretation. In patients with unstable renal function, e.g. those with acute kidney injury, the eGFR may not accurately reflect actual GFR.  Assessment/Plan   #Physical debility; doing better   - Continue PT/OT   # Metabolic encephalopathy; resolved   - mild Dementia with delirium; mental status improves   -Likely secondary to infection  -Continue encourage PO intake  - Fall precaution  # Sepsis; resolved   # Pneumonia; resolved,   -Chest x-ray consistent with right lower lobe pneumonia  -He was treated with ceftriaxone and azithromycin  -Monitor weekly cbc   # DM type 2-Blood glucose elevated likely due to sepsis  -resumed metformin 1000 mg PO BID  -C/W Glimepiride Oral Tablet 8 MG PO daily   - c/w SITagliptin 100 mg PO daily   -Diabetic diet  -Will start Insulin sliding scale and Accu-Cheks  # Non-ischemic myocardial injury  - Elevated troponins  -EKG with no acute ischemic changes  -Troponin elevation likely from demand ischemia  -Trend troponins until downtrend  # CKD stage 3   -Cr stable; new baseline 1.6   - continue encourage PO fluid   # A fib  -continue home meds;  dilTIAZem HCl ER  240 MG PO daily  - c/w eliquis 5 mg PO BID  - Continue monitor HR/BP   #HTN; BP fairly controlled   - c/w Furosemide 40 mg PO daily   - Continue monitor weekly BMP   # HLD  -continue Atorvastatin 40 mg Po daily   #BPH  - c/w Flomax 0.8 mg PO daily   #Discharge planing issues   - Reviewed medications and educated patient's wife for reason of taking medication.   -  Explained the need for follow-up with specialist and also follow-up with community providers/PCP.  - Discussed with SW; patient requires a home visiting RN for monitoring blood sugar, finally patient would benefit from home PT/OT to  improve muscle strengthening & to establish a home exercise program due to deconditioning  #Med reviewed  #Labs reviewed    Time spending for discharge < 30 minutes

## 2023-04-02 PROBLEM — A41.9 SEPSIS (MULTI): Status: ACTIVE | Noted: 2023-04-02

## 2023-04-02 PROBLEM — J18.9 PNEUMONIA DUE TO INFECTIOUS ORGANISM: Status: ACTIVE | Noted: 2023-04-02

## 2023-04-02 NOTE — PROGRESS NOTES
Problem List Items Addressed This Visit          Nervous    Bilateral leg weakness - Primary     OT/PT         Cognitive dysfunction     C/w supportive care . Family involved.          Metabolic encephalopathy     2/2 sepsis PNA improved but not baseline. Coninue supprotive care            Respiratory    Pneumonia due to infectious organism     Treated and resolved.             Circulatory    Atrial fibrillation (CMS/[facility])     Rate controlled c/w Apixaban and Diltiazem         Benign essential hypertension     C/w meds monitor and adjsut            Genitourinary    Acute kidney injury superimposed on CKD (CMS/[facility])       Endocrine/Metabolic    Abnormal weight loss     2/2 sickness and Cognitive impairement. Liberalize food choices          Diabetes mellitus type 2, controlled (CMS/[facility])     C/w meds Metformin held 2/2 SILVIA. F/up with PCP            Immune    Sepsis (CMS/[facility])     Completed ATB .             Other    Non-ischemic myocardial injury (non-traumatic)    Physical debility     OT/PT/ST SN thru [facility] to improve gait and function        Subjective   Patient ID: Betito Chahal is a 83 y.o. male who is acute skilled care being seen and evaluated for multiple medical problems. Discharge planing.    HPI   A 83 year old Male with past medical history of dementia, Paroxysmal A-fib,hypertension, diastolic dysfunction, carotid stenosis, diabetes type 2, hyperlipidemia who was hospitalization due to altered mental status and fall. His worked up; Lactate as high as 6; troponin as high as 200 but this was felt likely 2/2 the RVR ; . Afib RVR HR in 140s. CXR with possible lower infiltrates and was treated with Lasix. He was given IV cardizem and IV fluids and HR and lactate improved.  F, He discharged to  for rehab. During rehab course his hemodynamic stable, mental status significantly improving. Tolerate PO intake. Blood glucose fairly controlled with PO antidiabetic. Denies F/C/S/N/V, constipation.      Review of  Systems  A 10 point ROS was performed with the patient denying any complaints at this  time aside from those listed in the HPI above.  Objective   Vitals: T 98.5 - 3/30/2023 18:32 Route: Forehead (non-contact)  /66 - 3/30/2023 18:32 Position: Sitting l/arm  P 77 - 3/30/2023 18:32 Pulse Type: Regular  Character: Normal.  R 18 - 3/30/2023 18:32  O2 97 %- 3/30/2023 18:32 Method: Room Air  .0 - 3/30/2023 16:57    Physical exam  Constitutional: awake, afebrile, not in acute distress  Eyes: clear sclera  ENMT: mucous membranes moist  Head/Neck: neck supple, trachea midline, no JVD  Respiratory/Thorax: Non labored breathing   Cardiovascular: RRR, no rubs/murmurs/gallops  Gastrointestinal: +BS x4, soft, nt/nd/ng/nr  Musculoskeletal: ROM WNL, normal strength   Extremities: no edema, no cellulitis  Neurological: A&O x2, person and place   Psychological: Appropriate mood and behavior  Skin: warm and dry, intact     Lab 3/30/23   CBC       White Blood Cell Count  10.43 k/uL 3.70-11.00  Final           RBC  4.26 m/uL 4.20-6.00  Final           Hemoglobin  10.5 g/dL 13.0-17.0 L Final           Hematocrit  33.8 % 39.0-51.0 L Final           MCV  79.3 fL 80.0-100.0 L Final           MCH  24.6 pg 26.0-34.0 L Final           MCHC  31.1 g/dL 30.5-36.0  Final           RDW-CV  18.8 % 11.5-15.0 H Final           Platelet Count  416 k/uL 150-400 H Final           MPV  10.8 fL 9.0-12.7  Final           Absolute nRBC  <0.01 k/uL <0.01  Final       Basic Metabolic Panl       Glucose  145 mg/dL 74-99 H Final      The American Diabetes Association (ADA) provides guidance for cutoff values for fasting glucose and random glucose. The ADA defines fasting as no caloric intake for at least 8 hours. Fasting plasma glucose results between 100 to 125 mg/dL indicate increased risk for diabetes (prediabetes).  Fasting plasma glucose results greater than or equal to 126 mg/dL meet the criteria for diagnosis of diabetes. In the absence of  unequivocal hyperglycemia, results should be confirmed by repeat testing. In a patient with classic symptoms of hyperglycemia or hyperglycemic crisis, random plasma glucose results greater than or equal to 200 mg/dL meet the criteria for diagnosis of diabetes.  Reference: Standards of Medical Care in Diabetes 2016, American Diabetes Association. Diabetes Care. 2016.39(Suppl 1).       BUN  25 mg/dL 9-24 H Final           Creatinine  1.85 mg/dL 0.73-1.22 H Final           Sodium  137 mmol/L 136-144  Final           Potassium  4.6 mmol/L 3.7-5.1  Final           Chloride  99 mmol/L   Final           CO2  25 mmol/L 22-30  Final           Anion Gap  13 mmol/L 9-18  Final           Calcium, Total  9.4 mg/dL 8.5-10.2  Final           Estimated Glomerular Filtration Rate  36 mL/min/1.73 meters squared >=60 L Final      Estimated Glomerular Filtration Rate (eGFR) is calculated using the 2021 CKD-EPI creatinine equation. This equation utilizes serum creatinine, sex, and age as parameters. The creatinine assay has traceable calibration to isotope dilution-mass spectrometry. Refer to KDIGO guidelines for clinical interpretation. In patients with unstable renal function, e.g. those with acute kidney injury, the eGFR may not accurately reflect actual GFR.  Assessment/Plan   #Physical debility; doing better   - Continue PT/OT   # Metabolic encephalopathy; resolved   - mild Dementia with delirium; mental status improves   -Likely secondary to infection  -Continue encourage PO intake  - Fall precaution  # Sepsis; resolved   # Pneumonia; resolved,   -Chest x-ray consistent with right lower lobe pneumonia  -He was treated with ceftriaxone and azithromycin  -Monitor weekly cbc   # DM type 2-Blood glucose elevated likely due to sepsis  -resumed metformin 1000 mg PO BID  -C/W Glimepiride Oral Tablet 8 MG PO daily   - c/w SITagliptin 100 mg PO daily   -Diabetic diet  -Will start Insulin sliding scale and Accu-Cheks  # Non-ischemic  myocardial injury  - Elevated troponins  -EKG with no acute ischemic changes  -Troponin elevation likely from demand ischemia  -Trend troponins until downtrend  # CKD stage 3   -Cr stable; new baseline 1.6   - continue encourage PO fluid   # A fib  -continue home meds;  dilTIAZem HCl ER  240 MG PO daily  - c/w eliquis 5 mg PO BID  - Continue monitor HR/BP   #HTN; BP fairly controlled   - c/w Furosemide 40 mg PO daily   - Continue monitor weekly BMP   # HLD  -continue Atorvastatin 40 mg Po daily   #BPH  - c/w Flomax 0.8 mg PO daily .   -  Explained the need for follow-up with specialist and also follow-up with community providers/PCP.

## 2023-04-05 ENCOUNTER — TELEPHONE (OUTPATIENT)
Dept: PRIMARY CARE | Facility: CLINIC | Age: 83
End: 2023-04-05

## 2023-04-05 NOTE — TELEPHONE ENCOUNTER
SYLVESTER CHRISTIAN FROM [facility] ([place], [place]) CALLED TO [facility] PT

## 2023-04-06 NOTE — TELEPHONE ENCOUNTER
Yes the patient can continue home physical therapy and Occupational Therapy.  He should also be sent for posthospitalization follow-up visit if he was recently admitted.  Please assist with scheduling.

## 2023-05-11 DIAGNOSIS — E87.6 HYPOKALEMIA: Primary | ICD-10-CM

## 2023-05-11 NOTE — TELEPHONE ENCOUNTER
PT HAS AN UPCOMING APPOINTMENT 5/16/23 AND IS REQUESTING A REFILL FOR POTASSIUM CHLORIDE 10 MG. [facility].

## 2023-05-12 ENCOUNTER — APPOINTMENT (OUTPATIENT)
Dept: PRIMARY CARE | Facility: CLINIC | Age: 83
End: 2023-05-12
Payer: MEDICARE

## 2023-05-15 RX ORDER — POTASSIUM CHLORIDE 750 MG/1
10 TABLET, FILM COATED, EXTENDED RELEASE ORAL 2 TIMES DAILY
Qty: 180 TABLET | Refills: 1 | Status: SHIPPED | OUTPATIENT
Start: 2023-05-15 | End: 2023-05-16 | Stop reason: SDUPTHER

## 2023-05-16 ENCOUNTER — OFFICE VISIT (OUTPATIENT)
Dept: PRIMARY CARE | Facility: CLINIC | Age: 83
End: 2023-05-16
Payer: MEDICARE

## 2023-05-16 VITALS
HEIGHT: 70 IN | HEART RATE: 76 BPM | DIASTOLIC BLOOD PRESSURE: 79 MMHG | BODY MASS INDEX: 26.48 KG/M2 | SYSTOLIC BLOOD PRESSURE: 153 MMHG | TEMPERATURE: 97.7 F | WEIGHT: 185 LBS

## 2023-05-16 DIAGNOSIS — E87.6 HYPOKALEMIA: ICD-10-CM

## 2023-05-16 DIAGNOSIS — I48.91 ATRIAL FIBRILLATION, UNSPECIFIED TYPE (MULTI): ICD-10-CM

## 2023-05-16 DIAGNOSIS — M17.0 PRIMARY OSTEOARTHRITIS OF BOTH KNEES: ICD-10-CM

## 2023-05-16 DIAGNOSIS — R26.2 DIFFICULTY WALKING: ICD-10-CM

## 2023-05-16 DIAGNOSIS — K86.9 PANCREATIC DISORDER (HHS-HCC): ICD-10-CM

## 2023-05-16 DIAGNOSIS — F09 COGNITIVE DYSFUNCTION: ICD-10-CM

## 2023-05-16 DIAGNOSIS — G93.41 METABOLIC ENCEPHALOPATHY: ICD-10-CM

## 2023-05-16 DIAGNOSIS — R60.0 BILATERAL LOWER EXTREMITY EDEMA: ICD-10-CM

## 2023-05-16 DIAGNOSIS — R29.898 BILATERAL LEG WEAKNESS: Primary | ICD-10-CM

## 2023-05-16 PROCEDURE — 1157F ADVNC CARE PLAN IN RCRD: CPT | Performed by: FAMILY MEDICINE

## 2023-05-16 PROCEDURE — 1159F MED LIST DOCD IN RCRD: CPT | Performed by: FAMILY MEDICINE

## 2023-05-16 PROCEDURE — 1036F TOBACCO NON-USER: CPT | Performed by: FAMILY MEDICINE

## 2023-05-16 PROCEDURE — 3077F SYST BP >= 140 MM HG: CPT | Performed by: FAMILY MEDICINE

## 2023-05-16 PROCEDURE — 99214 OFFICE O/P EST MOD 30 MIN: CPT | Performed by: FAMILY MEDICINE

## 2023-05-16 PROCEDURE — 3078F DIAST BP <80 MM HG: CPT | Performed by: FAMILY MEDICINE

## 2023-05-16 RX ORDER — POTASSIUM CHLORIDE 750 MG/1
10 TABLET, FILM COATED, EXTENDED RELEASE ORAL 2 TIMES DAILY
Qty: 180 TABLET | Refills: 1 | Status: SHIPPED | OUTPATIENT
Start: 2023-05-16 | End: 2024-03-12

## 2023-05-16 RX ORDER — FUROSEMIDE 20 MG/1
20 TABLET ORAL DAILY
Qty: 20 TABLET | Refills: 0 | Status: SHIPPED | OUTPATIENT
Start: 2023-05-16 | End: 2023-10-07 | Stop reason: WASHOUT

## 2023-05-16 ASSESSMENT — PAIN SCALES - GENERAL: PAINLEVEL: 0-NO PAIN

## 2023-05-16 NOTE — PROGRESS NOTES
Subjective   Patient ID: Betito Chahal is a 83 y.o. male who presents for Follow-up.  HPI    The patient is here for a transfer of care to HOUSE CALL.  It takes at least two hours for the patient to get ready to go to a doctor's visit.   The patient has no major concerns today otherwise.   A review of system was completed.  All systems were reviewed and were normal, except for the ones that are listed in the HPI.    Objective   Physical Exam    Assessment/Plan   Problem List Items Addressed This Visit          Nervous    Bilateral leg weakness - Primary    Relevant Orders    Referral to Geriatrics    Cognitive dysfunction    Relevant Orders    Referral to Geriatrics    Metabolic encephalopathy       Circulatory    Atrial fibrillation (CMS/HCC)       Digestive    Pancreatic disorder       Musculoskeletal    Bilateral lower extremity edema    Relevant Medications    furosemide (Lasix) 20 mg tablet    Primary osteoarthritis of both knees    Relevant Orders    Referral to Geriatrics       Other    Difficulty walking    Relevant Orders    Referral to Geriatrics     Other Visit Diagnoses       Hypokalemia        Relevant Medications    potassium chloride CR 10 mEq ER tablet

## 2023-06-23 LAB
ALANINE AMINOTRANSFERASE (SGPT) (U/L) IN SER/PLAS: 6 U/L (ref 10–52)
ALBUMIN (G/DL) IN SER/PLAS: 3.2 G/DL (ref 3.4–5)
ALKALINE PHOSPHATASE (U/L) IN SER/PLAS: 81 U/L (ref 33–136)
ANION GAP IN SER/PLAS: 21 MMOL/L (ref 10–20)
ASPARTATE AMINOTRANSFERASE (SGOT) (U/L) IN SER/PLAS: 8 U/L (ref 9–39)
BILIRUBIN TOTAL (MG/DL) IN SER/PLAS: 0.3 MG/DL (ref 0–1.2)
CALCIDIOL (25 OH VITAMIN D3) (NG/ML) IN SER/PLAS: 17 NG/ML
CALCIUM (MG/DL) IN SER/PLAS: 8.9 MG/DL (ref 8.6–10.3)
CARBON DIOXIDE, TOTAL (MMOL/L) IN SER/PLAS: 22 MMOL/L (ref 21–32)
CHLORIDE (MMOL/L) IN SER/PLAS: 99 MMOL/L (ref 98–107)
CHOLESTEROL (MG/DL) IN SER/PLAS: 108 MG/DL (ref 0–199)
CHOLESTEROL IN HDL (MG/DL) IN SER/PLAS: 38.2 MG/DL
CHOLESTEROL/HDL RATIO: 2.8
CREATININE (MG/DL) IN SER/PLAS: 1.9 MG/DL (ref 0.5–1.3)
ERYTHROCYTE DISTRIBUTION WIDTH (RATIO) BY AUTOMATED COUNT: 18.9 % (ref 11.5–14.5)
ERYTHROCYTE MEAN CORPUSCULAR HEMOGLOBIN CONCENTRATION (G/DL) BY AUTOMATED: 29 G/DL (ref 32–36)
ERYTHROCYTE MEAN CORPUSCULAR VOLUME (FL) BY AUTOMATED COUNT: 81 FL (ref 80–100)
ERYTHROCYTES (10*6/UL) IN BLOOD BY AUTOMATED COUNT: 4.25 X10E12/L (ref 4.5–5.9)
ESTIMATED AVERAGE GLUCOSE FOR HBA1C: 229 MG/DL
GFR MALE: 35 ML/MIN/1.73M2
GLUCOSE (MG/DL) IN SER/PLAS: 208 MG/DL (ref 74–99)
HEMATOCRIT (%) IN BLOOD BY AUTOMATED COUNT: 34.5 % (ref 41–52)
HEMOGLOBIN (G/DL) IN BLOOD: 10 G/DL (ref 13.5–17.5)
HEMOGLOBIN A1C/HEMOGLOBIN TOTAL IN BLOOD: 9.6 %
LEUKOCYTES (10*3/UL) IN BLOOD BY AUTOMATED COUNT: 8.1 X10E9/L (ref 4.4–11.3)
NON-HDL CHOLESTEROL: 70 MG/DL
PLATELETS (10*3/UL) IN BLOOD AUTOMATED COUNT: 380 X10E9/L (ref 150–450)
POTASSIUM (MMOL/L) IN SER/PLAS: 4.5 MMOL/L (ref 3.5–5.3)
PROTEIN TOTAL: 5.9 G/DL (ref 6.4–8.2)
SODIUM (MMOL/L) IN SER/PLAS: 137 MMOL/L (ref 136–145)
THYROTROPIN (MIU/L) IN SER/PLAS BY DETECTION LIMIT <= 0.05 MIU/L: 2.14 MIU/L (ref 0.44–3.98)
UREA NITROGEN (MG/DL) IN SER/PLAS: 25 MG/DL (ref 6–23)

## 2023-07-07 DIAGNOSIS — N40.0 BENIGN PROSTATIC HYPERPLASIA WITHOUT LOWER URINARY TRACT SYMPTOMS: Primary | ICD-10-CM

## 2023-07-07 NOTE — TELEPHONE ENCOUNTER
Pt called requesting a refill for tamsulosin 0.4mg please send to the [facility].

## 2023-07-08 RX ORDER — TAMSULOSIN HYDROCHLORIDE 0.4 MG/1
0.8 CAPSULE ORAL DAILY
Qty: 90 CAPSULE | Refills: 1 | Status: SHIPPED | OUTPATIENT
Start: 2023-07-08 | End: 2024-03-12

## 2023-07-14 DIAGNOSIS — E11.29 CONTROLLED TYPE 2 DIABETES MELLITUS WITH OTHER DIABETIC KIDNEY COMPLICATION, WITHOUT LONG-TERM CURRENT USE OF INSULIN (MULTI): Primary | ICD-10-CM

## 2023-07-14 RX ORDER — FLASH GLUCOSE SENSOR
KIT MISCELLANEOUS
Qty: 1 EACH | Refills: 5 | Status: SHIPPED | OUTPATIENT
Start: 2023-07-14 | End: 2023-12-09

## 2023-10-04 ENCOUNTER — OFFICE VISIT (OUTPATIENT)
Dept: GERIATRIC MEDICINE | Facility: CLINIC | Age: 83
End: 2023-10-04
Payer: MEDICARE

## 2023-10-04 DIAGNOSIS — E11.29 CONTROLLED TYPE 2 DIABETES MELLITUS WITH OTHER DIABETIC KIDNEY COMPLICATION, WITHOUT LONG-TERM CURRENT USE OF INSULIN (MULTI): Primary | ICD-10-CM

## 2023-10-04 DIAGNOSIS — E55.9 VITAMIN D DEFICIENCY: ICD-10-CM

## 2023-10-04 DIAGNOSIS — F02.B0 MODERATE ALZHEIMER'S DEMENTIA OF OTHER ONSET WITHOUT BEHAVIORAL DISTURBANCE, PSYCHOTIC DISTURBANCE, MOOD DISTURBANCE, OR ANXIETY (MULTI): ICD-10-CM

## 2023-10-04 DIAGNOSIS — I10 BENIGN ESSENTIAL HYPERTENSION: ICD-10-CM

## 2023-10-04 DIAGNOSIS — G30.8 MODERATE ALZHEIMER'S DEMENTIA OF OTHER ONSET WITHOUT BEHAVIORAL DISTURBANCE, PSYCHOTIC DISTURBANCE, MOOD DISTURBANCE, OR ANXIETY (MULTI): ICD-10-CM

## 2023-10-04 DIAGNOSIS — D64.9 CHRONIC ANEMIA: ICD-10-CM

## 2023-10-04 DIAGNOSIS — R60.0 BILATERAL LOWER EXTREMITY EDEMA: ICD-10-CM

## 2023-10-04 DIAGNOSIS — N18.32 CHRONIC RENAL IMPAIRMENT, STAGE 3B (MULTI): ICD-10-CM

## 2023-10-04 DIAGNOSIS — N40.0 ENLARGED PROSTATE: ICD-10-CM

## 2023-10-04 DIAGNOSIS — I48.20 CHRONIC ATRIAL FIBRILLATION (MULTI): ICD-10-CM

## 2023-10-04 DIAGNOSIS — E78.5 DYSLIPIDEMIA: ICD-10-CM

## 2023-10-04 PROCEDURE — 99349 HOME/RES VST EST MOD MDM 40: CPT | Performed by: NURSE PRACTITIONER

## 2023-10-04 PROCEDURE — 1126F AMNT PAIN NOTED NONE PRSNT: CPT | Performed by: NURSE PRACTITIONER

## 2023-10-04 PROCEDURE — 1159F MED LIST DOCD IN RCRD: CPT | Performed by: NURSE PRACTITIONER

## 2023-10-04 PROCEDURE — 3078F DIAST BP <80 MM HG: CPT | Performed by: NURSE PRACTITIONER

## 2023-10-04 PROCEDURE — 1160F RVW MEDS BY RX/DR IN RCRD: CPT | Performed by: NURSE PRACTITIONER

## 2023-10-04 PROCEDURE — 3077F SYST BP >= 140 MM HG: CPT | Performed by: NURSE PRACTITIONER

## 2023-10-04 PROCEDURE — 1036F TOBACCO NON-USER: CPT | Performed by: NURSE PRACTITIONER

## 2023-10-07 VITALS
RESPIRATION RATE: 16 BRPM | TEMPERATURE: 97.5 F | SYSTOLIC BLOOD PRESSURE: 142 MMHG | HEART RATE: 74 BPM | DIASTOLIC BLOOD PRESSURE: 70 MMHG

## 2023-10-07 RX ORDER — FUROSEMIDE 20 MG/1
20 TABLET ORAL DAILY
COMMUNITY
End: 2023-10-07 | Stop reason: WASHOUT

## 2023-10-07 RX ORDER — FUROSEMIDE 20 MG/1
20 TABLET ORAL DAILY
COMMUNITY
End: 2023-11-14

## 2023-10-07 RX ORDER — TIMOLOL MALEATE 5 MG/ML
1 SOLUTION/ DROPS OPHTHALMIC 2 TIMES DAILY
COMMUNITY

## 2023-10-10 ENCOUNTER — HOME HEALTH ADMISSION (OUTPATIENT)
Dept: HOME HEALTH SERVICES | Facility: HOME HEALTH | Age: 83
End: 2023-10-10

## 2023-10-10 PROBLEM — J18.9 PNEUMONIA DUE TO INFECTIOUS ORGANISM: Status: RESOLVED | Noted: 2023-04-02 | Resolved: 2023-10-10

## 2023-10-10 PROBLEM — K86.9 PANCREATIC DISORDER (HHS-HCC): Status: RESOLVED | Noted: 2023-03-18 | Resolved: 2023-10-10

## 2023-10-10 PROBLEM — N17.9 ACUTE KIDNEY INJURY SUPERIMPOSED ON CKD (CMS-HCC): Status: RESOLVED | Noted: 2023-03-21 | Resolved: 2023-10-10

## 2023-10-10 PROBLEM — N18.9 ACUTE KIDNEY INJURY SUPERIMPOSED ON CKD (CMS-HCC): Status: RESOLVED | Noted: 2023-03-21 | Resolved: 2023-10-10

## 2023-10-10 PROBLEM — G30.9 ALZHEIMER'S DEMENTIA (MULTI): Status: ACTIVE | Noted: 2023-10-10

## 2023-10-10 PROBLEM — F02.80 ALZHEIMER'S DEMENTIA (MULTI): Status: ACTIVE | Noted: 2023-10-10

## 2023-10-10 PROBLEM — G93.41 METABOLIC ENCEPHALOPATHY: Status: RESOLVED | Noted: 2023-03-21 | Resolved: 2023-10-10

## 2023-10-10 PROBLEM — R63.4 ABNORMAL WEIGHT LOSS: Status: RESOLVED | Noted: 2023-03-18 | Resolved: 2023-10-10

## 2023-10-10 PROBLEM — I5A NON-ISCHEMIC MYOCARDIAL INJURY (NON-TRAUMATIC): Status: RESOLVED | Noted: 2023-03-21 | Resolved: 2023-10-10

## 2023-10-10 PROBLEM — E55.9 VITAMIN D DEFICIENCY: Status: ACTIVE | Noted: 2023-10-10

## 2023-10-10 PROBLEM — I10 UNCONTROLLED HYPERTENSION: Status: RESOLVED | Noted: 2023-03-18 | Resolved: 2023-10-10

## 2023-10-10 PROBLEM — A41.9 SEPSIS (MULTI): Status: RESOLVED | Noted: 2023-04-02 | Resolved: 2023-10-10

## 2023-10-10 PROBLEM — R74.8 ELEVATED LIPASE: Status: RESOLVED | Noted: 2023-03-18 | Resolved: 2023-10-10

## 2023-10-10 NOTE — PROGRESS NOTES
Subjective   Patient ID: Betito Chahal is a 83 y.o. male who presents for Follow-up.  HPI  Patient is a poor historian, most of history per caregiver Roz Bethee had been instructed to stop the glimepiride on 9/19/2023, but she did not get the message and patient had still been getting 1 glimepiride about a day, but she stopped it 2 days ago because of low blood sugars  Roz has been checking blood sugars after meals and they have been running in the 200s-the highest was 265    Good appetite  Still feeds himself, but she needs to cut it up-occasionally he just uses his fingers    Roz reports that patient has been a little more confused recently  No specific behavior issues    Roz took him to an ophthalmologist in the community-got a good report and they will remove the glaucoma eyedrops    Originally Roz indicated that she wanted patient to have a flu shot, but has not decided against it since he does not go out and have very few visitors    Roz would like information on a dentist that would come to the home-she had been given contact information for [facility] [facility] in the past, but had misplaced that    No skin issues-no open areas or rashes    No falls  No pain issues    No cough or shortness of breath    Objective   /70   Pulse 74   Temp 36.4 °C (97.5 °F)   Resp 16     Physical Exam  alert, elderly, well groomed and developed aam in nad  pleasant and cooperative  able to follow commands and answer a few simple questions  mm moist  no jvd  rrr  bs cta bilat, not labored  protuberant, soft, nt/nd  no edema  visible skin intact  Able to get up off the chair independently and ambulated down the martínez with a fairly stable gait using a rolling walker    Assessment/Plan   1. Bilateral lower extremity edema-Stable  - Comprehensive metabolic panel; Future  -Continue chlorthalidone 25 mg daily and Lasix 20 mg as needed-patient has not needed the extra Lasix    2. Chronic anemia  - CBC; Future    3.  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, without long-term current use of insulin (CMS/HCC)  -Blood sugars have been running low and the glimepiride was decreased and eventually stopped-blood sugars now in the 200s postprandial  - Instructed Roz to check blood sugars before meals and as needed-to call if these blood sugars are >200  - Hemoglobin A1c    4. Vitamin D deficiency  -Vitamin D -6/23/2023 vit D =  units daily was added  - Vitamin D 25-Hydroxy,Total (for eval of Vitamin D levels); Future    5. Benign essential hypertension-BP well controlled  -Continue diltiazem 240 mg daily  - Continue chlorthalidone 25 mg daily with KCl 10 mEq twice daily  - Check labs    6. Dyslipidemia  -Well-controlled-continue atorvastatin 40 mg daily    7. Chronic renal impairment, stage 3b (CMS/HCC)  -6/23/2023 CR = 1.9 with GFR = 35  - Avoid nephrotoxic meds including NSAIDs  - Maintain hydration  - Check labs    8. Chronic atrial fibrillation (CMS/HCC)  -Rate controlled with diltiazem  - Continue Eliquis 2.5 mg twice daily    9. Moderate Alzheimer's dementia of other onset without behavioral disturbance, psychotic disturbance, mood disturbance, or anxiety (CMS/HCC)  -Did not tolerate donepezil because of decreased appetite  - Consider Namenda at next visit    10. Enlarged prostate  -Stable-continue tamsulosin 0.8 mg nightly

## 2023-10-10 NOTE — PATIENT INSTRUCTIONS
I will plan to see Mr. Chhaal again in about 3 months, but please call if there are any issues in the meantime

## 2023-10-11 ENCOUNTER — HOME CARE VISIT (OUTPATIENT)
Dept: HOME HEALTH SERVICES | Facility: HOME HEALTH | Age: 83
End: 2023-10-11

## 2023-10-11 PROCEDURE — 85027 COMPLETE CBC AUTOMATED: CPT

## 2023-10-11 PROCEDURE — 36415 COLL VENOUS BLD VENIPUNCTURE: CPT

## 2023-10-11 PROCEDURE — 82306 VITAMIN D 25 HYDROXY: CPT

## 2023-10-11 PROCEDURE — 80053 COMPREHEN METABOLIC PANEL: CPT

## 2023-10-11 PROCEDURE — 83036 HEMOGLOBIN GLYCOSYLATED A1C: CPT

## 2023-11-10 DIAGNOSIS — E11.29: ICD-10-CM

## 2023-11-11 RX ORDER — METFORMIN HYDROCHLORIDE 1000 MG/1
1000 TABLET ORAL 2 TIMES DAILY
Qty: 180 TABLET | Refills: 1 | Status: SHIPPED | OUTPATIENT
Start: 2023-11-11 | End: 2023-12-20

## 2023-11-24 DIAGNOSIS — E11.29: ICD-10-CM

## 2023-11-24 DIAGNOSIS — R60.0 BILATERAL LOWER EXTREMITY EDEMA: ICD-10-CM

## 2023-11-24 RX ORDER — FUROSEMIDE 20 MG/1
TABLET ORAL
Qty: 90 TABLET | Refills: 0 | OUTPATIENT
Start: 2023-11-24

## 2023-11-24 RX ORDER — METFORMIN HYDROCHLORIDE 1000 MG/1
1000 TABLET ORAL 2 TIMES DAILY
Qty: 180 TABLET | Refills: 1 | OUTPATIENT
Start: 2023-11-24

## 2023-12-04 DIAGNOSIS — R60.0 BILATERAL LOWER EXTREMITY EDEMA: ICD-10-CM

## 2023-12-04 DIAGNOSIS — E11.29: ICD-10-CM

## 2023-12-04 RX ORDER — FUROSEMIDE 20 MG/1
TABLET ORAL
Qty: 90 TABLET | Refills: 0 | OUTPATIENT
Start: 2023-12-04

## 2023-12-04 RX ORDER — METFORMIN HYDROCHLORIDE 1000 MG/1
1000 TABLET ORAL 2 TIMES DAILY
Qty: 180 TABLET | Refills: 1 | OUTPATIENT
Start: 2023-12-04

## 2023-12-06 DIAGNOSIS — I10 BENIGN ESSENTIAL HYPERTENSION: Primary | ICD-10-CM

## 2023-12-06 DIAGNOSIS — E78.2 HYPERLIPEMIA, MIXED: ICD-10-CM

## 2023-12-06 DIAGNOSIS — R60.0 BILATERAL LOWER EXTREMITY EDEMA: ICD-10-CM

## 2023-12-06 RX ORDER — CHLORTHALIDONE 25 MG/1
25 TABLET ORAL
Qty: 90 TABLET | Refills: 1 | Status: SHIPPED | OUTPATIENT
Start: 2023-12-06

## 2023-12-06 RX ORDER — DILTIAZEM HYDROCHLORIDE 240 MG/1
240 CAPSULE, COATED, EXTENDED RELEASE ORAL DAILY
Qty: 90 CAPSULE | Refills: 1 | Status: SHIPPED | OUTPATIENT
Start: 2023-12-06

## 2023-12-06 RX ORDER — ATORVASTATIN CALCIUM 40 MG/1
40 TABLET, FILM COATED ORAL NIGHTLY
Qty: 90 TABLET | Refills: 1 | Status: SHIPPED | OUTPATIENT
Start: 2023-12-06

## 2023-12-19 DIAGNOSIS — R60.0 BILATERAL LOWER EXTREMITY EDEMA: ICD-10-CM

## 2023-12-19 DIAGNOSIS — E11.29: ICD-10-CM

## 2023-12-20 RX ORDER — FUROSEMIDE 20 MG/1
TABLET ORAL
Qty: 90 TABLET | Refills: 0 | Status: SHIPPED | OUTPATIENT
Start: 2023-12-20 | End: 2024-01-11 | Stop reason: SDUPTHER

## 2023-12-20 RX ORDER — METFORMIN HYDROCHLORIDE 1000 MG/1
1000 TABLET ORAL 2 TIMES DAILY
Qty: 180 TABLET | Refills: 1 | Status: SHIPPED | OUTPATIENT
Start: 2023-12-20 | End: 2024-06-07 | Stop reason: SDUPTHER

## 2024-01-05 ENCOUNTER — OFFICE VISIT (OUTPATIENT)
Dept: GERIATRIC MEDICINE | Facility: CLINIC | Age: 84
End: 2024-01-05
Payer: MEDICARE

## 2024-01-05 DIAGNOSIS — D64.9 CHRONIC ANEMIA: ICD-10-CM

## 2024-01-05 DIAGNOSIS — N40.0 ENLARGED PROSTATE: ICD-10-CM

## 2024-01-05 DIAGNOSIS — G30.1 MODERATE LATE ONSET ALZHEIMER'S DEMENTIA WITHOUT BEHAVIORAL DISTURBANCE, PSYCHOTIC DISTURBANCE, MOOD DISTURBANCE, OR ANXIETY (MULTI): Primary | ICD-10-CM

## 2024-01-05 DIAGNOSIS — R60.0 BILATERAL LOWER EXTREMITY EDEMA: ICD-10-CM

## 2024-01-05 DIAGNOSIS — E11.29 CONTROLLED TYPE 2 DIABETES MELLITUS WITH OTHER DIABETIC KIDNEY COMPLICATION, WITHOUT LONG-TERM CURRENT USE OF INSULIN (MULTI): ICD-10-CM

## 2024-01-05 DIAGNOSIS — N18.32 CHRONIC RENAL IMPAIRMENT, STAGE 3B (MULTI): ICD-10-CM

## 2024-01-05 DIAGNOSIS — F02.B0 MODERATE LATE ONSET ALZHEIMER'S DEMENTIA WITHOUT BEHAVIORAL DISTURBANCE, PSYCHOTIC DISTURBANCE, MOOD DISTURBANCE, OR ANXIETY (MULTI): Primary | ICD-10-CM

## 2024-01-05 DIAGNOSIS — I10 BENIGN ESSENTIAL HYPERTENSION: ICD-10-CM

## 2024-01-05 DIAGNOSIS — E55.9 VITAMIN D DEFICIENCY: ICD-10-CM

## 2024-01-05 DIAGNOSIS — I48.20 CHRONIC ATRIAL FIBRILLATION (MULTI): ICD-10-CM

## 2024-01-05 DIAGNOSIS — E78.5 DYSLIPIDEMIA: ICD-10-CM

## 2024-01-05 PROCEDURE — 1036F TOBACCO NON-USER: CPT | Performed by: NURSE PRACTITIONER

## 2024-01-05 PROCEDURE — 99349 HOME/RES VST EST MOD MDM 40: CPT | Performed by: NURSE PRACTITIONER

## 2024-01-05 PROCEDURE — 1160F RVW MEDS BY RX/DR IN RCRD: CPT | Performed by: NURSE PRACTITIONER

## 2024-01-05 PROCEDURE — 3079F DIAST BP 80-89 MM HG: CPT | Performed by: NURSE PRACTITIONER

## 2024-01-05 PROCEDURE — 1126F AMNT PAIN NOTED NONE PRSNT: CPT | Performed by: NURSE PRACTITIONER

## 2024-01-05 PROCEDURE — 1159F MED LIST DOCD IN RCRD: CPT | Performed by: NURSE PRACTITIONER

## 2024-01-05 PROCEDURE — 3075F SYST BP GE 130 - 139MM HG: CPT | Performed by: NURSE PRACTITIONER

## 2024-01-06 VITALS
RESPIRATION RATE: 16 BRPM | OXYGEN SATURATION: 98 % | SYSTOLIC BLOOD PRESSURE: 138 MMHG | DIASTOLIC BLOOD PRESSURE: 84 MMHG | HEART RATE: 66 BPM | TEMPERATURE: 97.7 F

## 2024-01-06 RX ORDER — CHOLECALCIFEROL (VITAMIN D3) 25 MCG
1000 TABLET ORAL DAILY
COMMUNITY
End: 2024-02-08 | Stop reason: SDUPTHER

## 2024-01-06 NOTE — PATIENT INSTRUCTIONS
I will plan to see you again in about 3 months, but please call if any issues in the meantime    I will have the phlebotomist come out to draw labs

## 2024-01-06 NOTE — PROGRESS NOTES
Subjective   Patient ID: Betito Chahal is a 83 y.o. male who presents for Follow-up.    HPI  Patient is a poor historian, most of history per caregiver, Roz    BS = 129-170  Has been taking metformin 1000 mg twice daily since 10/11/2023 when hemoglobin A1c = 8.8  No GI side effects  No hypoglycemic episodes    Patient still toilets independently and feeds himself  Spends most of his time in his bedroom, but Roz's grandson helps to walk him in the hallway for exercise    No pain issues  No falls  No open areas or rashes    No cough or shortness of breath    Good appetite  Bowels moving well  Objective   /84   Pulse 66   Temp 36.5 °C (97.7 °F)   Resp 16   SpO2 98%       Chemistry    Lab Results   Component Value Date/Time     10/11/2023 1340    K 4.3 10/11/2023 1340     10/11/2023 1340    CO2 25 10/11/2023 1340    BUN 19 10/11/2023 1340    CREATININE 1.89 (H) 10/11/2023 1340    Lab Results   Component Value Date/Time    CALCIUM 8.3 (L) 10/11/2023 1340    ALKPHOS 80 10/11/2023 1340    AST 8 (L) 10/11/2023 1340    ALT 6 (L) 10/11/2023 1340    BILITOT 0.3 10/11/2023 1340        Lab Results   Component Value Date    WBC 7.5 10/11/2023    HGB 10.0 (L) 10/11/2023    HCT 33.6 (L) 10/11/2023    MCV 79 (L) 10/11/2023     10/11/2023     Lab Results   Component Value Date    HGBA1C 8.8 (H) 10/11/2023     Physical Exam  alert, elderly, well groomed and well developed aam in nad  pleasant and cooperative  able to follow commands and answer a few simple questions  mm moist  no jvd noted  rrr  bs cta bilat, not labored  protuberant, soft, nt/nd  no edema  visible skin intact  Assessment/Plan      Bilateral lower extremity edema  - Stable  - Continue chlorthalidone 25 mg daily and Lasix 20 mg prn-patient has not needed the extra Lasix     Chronic anemia  -H/H = 10.0/33.6-stable    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, without long-term current use of insulin  (CMS/[facility])  -10/11/2023 hemoglobin A1c = 8.8-metformin 1000 mg twice daily was resumed  - Blood sugars well-controlled recently  - Hemoglobin A1c    Vitamin D deficiency  -10/11/2023 vit D = 33  - Continue vitamin D 1000 units daily    Benign essential hypertension  - BP well controlled  -Continue diltiazem 240 mg daily  - Continue chlorthalidone 25 mg daily with KCl 10 mEq twice daily  - Check labs     Dyslipidemia  -Well-controlled-continue atorvastatin 40 mg daily     Chronic renal impairment, stage 3b (CMS/[facility])  - 10/11/2023 CR = 1.89 with GFR = 35-stable  - Avoid nephrotoxic meds including NSAIDs  - Maintain hydration  - Check labs     Chronic atrial fibrillation (CMS/[facility])  -Rate controlled with diltiazem  - Continue Eliquis 2.5 mg twice daily     Moderate Alzheimer's dementia of other onset without behavioral disturbance, psychotic disturbance, mood disturbance, or anxiety (CMS/[facility])  -Stable without any behavioral issues     Enlarged prostate  - Seems to be voiding okay  - continue tamsulosin 0.8 mg nightly

## 2024-01-11 DIAGNOSIS — E11.29 CONTROLLED TYPE 2 DIABETES MELLITUS WITH OTHER DIABETIC KIDNEY COMPLICATION, WITHOUT LONG-TERM CURRENT USE OF INSULIN (MULTI): Primary | ICD-10-CM

## 2024-01-11 DIAGNOSIS — R60.0 BILATERAL LOWER EXTREMITY EDEMA: ICD-10-CM

## 2024-01-11 DIAGNOSIS — I73.9 PAD (PERIPHERAL ARTERY DISEASE) (CMS-HCC): ICD-10-CM

## 2024-01-11 RX ORDER — FUROSEMIDE 20 MG/1
20 TABLET ORAL DAILY PRN
Qty: 30 TABLET | Refills: 1 | Status: SHIPPED | OUTPATIENT
Start: 2024-01-11

## 2024-01-12 ENCOUNTER — LAB (OUTPATIENT)
Dept: LAB | Facility: LAB | Age: 84
End: 2024-01-12
Payer: MEDICARE

## 2024-01-12 ENCOUNTER — HOME CARE VISIT (OUTPATIENT)
Dept: HOME HEALTH SERVICES | Facility: HOME HEALTH | Age: 84
End: 2024-01-12

## 2024-01-12 DIAGNOSIS — D64.9 CHRONIC ANEMIA: ICD-10-CM

## 2024-01-12 DIAGNOSIS — N18.32 CHRONIC RENAL IMPAIRMENT, STAGE 3B (MULTI): ICD-10-CM

## 2024-01-12 DIAGNOSIS — E11.29 CONTROLLED TYPE 2 DIABETES MELLITUS WITH OTHER DIABETIC KIDNEY COMPLICATION, WITHOUT LONG-TERM CURRENT USE OF INSULIN (MULTI): ICD-10-CM

## 2024-01-12 LAB
ALBUMIN SERPL BCP-MCNC: 2.9 G/DL (ref 3.4–5)
ALP SERPL-CCNC: 79 U/L (ref 33–136)
ALT SERPL W P-5'-P-CCNC: 9 U/L (ref 10–52)
ANION GAP SERPL CALC-SCNC: 18 MMOL/L (ref 10–20)
AST SERPL W P-5'-P-CCNC: 11 U/L (ref 9–39)
BILIRUB SERPL-MCNC: 0.4 MG/DL (ref 0–1.2)
BUN SERPL-MCNC: 17 MG/DL (ref 6–23)
CALCIUM SERPL-MCNC: 8.2 MG/DL (ref 8.6–10.3)
CHLORIDE SERPL-SCNC: 102 MMOL/L (ref 98–107)
CO2 SERPL-SCNC: 22 MMOL/L (ref 21–32)
CREAT SERPL-MCNC: 1.66 MG/DL (ref 0.5–1.3)
EGFRCR SERPLBLD CKD-EPI 2021: 41 ML/MIN/1.73M*2
ERYTHROCYTE [DISTWIDTH] IN BLOOD BY AUTOMATED COUNT: 17.5 % (ref 11.5–14.5)
EST. AVERAGE GLUCOSE BLD GHB EST-MCNC: 223 MG/DL
GLUCOSE SERPL-MCNC: 154 MG/DL (ref 74–99)
HBA1C MFR BLD: 9.4 %
HCT VFR BLD AUTO: 32.4 % (ref 41–52)
HGB BLD-MCNC: 9.8 G/DL (ref 13.5–17.5)
MCH RBC QN AUTO: 24 PG (ref 26–34)
MCHC RBC AUTO-ENTMCNC: 30.2 G/DL (ref 32–36)
MCV RBC AUTO: 79 FL (ref 80–100)
NRBC BLD-RTO: 0 /100 WBCS (ref 0–0)
PLATELET # BLD AUTO: 416 X10*3/UL (ref 150–450)
POTASSIUM SERPL-SCNC: 4 MMOL/L (ref 3.5–5.3)
PROT SERPL-MCNC: 5.5 G/DL (ref 6.4–8.2)
RBC # BLD AUTO: 4.08 X10*6/UL (ref 4.5–5.9)
SODIUM SERPL-SCNC: 138 MMOL/L (ref 136–145)
WBC # BLD AUTO: 9.2 X10*3/UL (ref 4.4–11.3)

## 2024-01-12 PROCEDURE — 83036 HEMOGLOBIN GLYCOSYLATED A1C: CPT

## 2024-01-12 PROCEDURE — 85027 COMPLETE CBC AUTOMATED: CPT

## 2024-01-12 PROCEDURE — 36415 COLL VENOUS BLD VENIPUNCTURE: CPT

## 2024-01-12 PROCEDURE — 80053 COMPREHEN METABOLIC PANEL: CPT

## 2024-01-15 DIAGNOSIS — E11.29 CONTROLLED TYPE 2 DIABETES MELLITUS WITH OTHER DIABETIC KIDNEY COMPLICATION, WITHOUT LONG-TERM CURRENT USE OF INSULIN (MULTI): Primary | ICD-10-CM

## 2024-01-15 RX ORDER — GLIMEPIRIDE 1 MG/1
1 TABLET ORAL
Qty: 30 TABLET | Refills: 2 | Status: SHIPPED | OUTPATIENT
Start: 2024-01-15 | End: 2024-01-24 | Stop reason: SINTOL

## 2024-01-24 ENCOUNTER — DOCUMENTATION (OUTPATIENT)
Dept: GERIATRIC MEDICINE | Facility: CLINIC | Age: 84
End: 2024-01-24
Payer: MEDICARE

## 2024-01-24 ENCOUNTER — HOSPITAL ENCOUNTER (OUTPATIENT)
Dept: VASCULAR MEDICINE | Facility: HOSPITAL | Age: 84
Discharge: HOME | End: 2024-01-24
Payer: MEDICARE

## 2024-01-24 DIAGNOSIS — I73.9 PVD (PERIPHERAL VASCULAR DISEASE) (CMS-HCC): Primary | ICD-10-CM

## 2024-01-24 DIAGNOSIS — I73.9 PAD (PERIPHERAL ARTERY DISEASE) (CMS-HCC): ICD-10-CM

## 2024-01-24 PROCEDURE — 93922 UPR/L XTREMITY ART 2 LEVELS: CPT

## 2024-01-24 PROCEDURE — 93922 UPR/L XTREMITY ART 2 LEVELS: CPT | Performed by: INTERNAL MEDICINE

## 2024-01-24 NOTE — PROGRESS NOTES
Received t.c. from caregiver, Roz reporting BS in the 70's and 80's since glimepiride 1 mg was added.  Has been eating good.  Will stop the glimepiride and continue metformin.

## 2024-02-07 ENCOUNTER — OFFICE VISIT (OUTPATIENT)
Dept: GERIATRIC MEDICINE | Facility: CLINIC | Age: 84
End: 2024-02-07
Payer: MEDICARE

## 2024-02-07 DIAGNOSIS — I10 BENIGN ESSENTIAL HYPERTENSION: ICD-10-CM

## 2024-02-07 DIAGNOSIS — I48.20 CHRONIC ATRIAL FIBRILLATION (MULTI): ICD-10-CM

## 2024-02-07 DIAGNOSIS — E78.5 DYSLIPIDEMIA: ICD-10-CM

## 2024-02-07 DIAGNOSIS — G30.1 MODERATE LATE ONSET ALZHEIMER'S DEMENTIA WITHOUT BEHAVIORAL DISTURBANCE, PSYCHOTIC DISTURBANCE, MOOD DISTURBANCE, OR ANXIETY (MULTI): ICD-10-CM

## 2024-02-07 DIAGNOSIS — E11.29 CONTROLLED TYPE 2 DIABETES MELLITUS WITH OTHER DIABETIC KIDNEY COMPLICATION, WITHOUT LONG-TERM CURRENT USE OF INSULIN (MULTI): Primary | ICD-10-CM

## 2024-02-07 DIAGNOSIS — E55.9 VITAMIN D DEFICIENCY: ICD-10-CM

## 2024-02-07 DIAGNOSIS — R60.0 BILATERAL LOWER EXTREMITY EDEMA: ICD-10-CM

## 2024-02-07 DIAGNOSIS — F02.B0 MODERATE LATE ONSET ALZHEIMER'S DEMENTIA WITHOUT BEHAVIORAL DISTURBANCE, PSYCHOTIC DISTURBANCE, MOOD DISTURBANCE, OR ANXIETY (MULTI): ICD-10-CM

## 2024-02-07 DIAGNOSIS — N40.0 ENLARGED PROSTATE: ICD-10-CM

## 2024-02-07 DIAGNOSIS — N18.32 CHRONIC RENAL IMPAIRMENT, STAGE 3B (MULTI): ICD-10-CM

## 2024-02-07 PROCEDURE — 1036F TOBACCO NON-USER: CPT | Performed by: NURSE PRACTITIONER

## 2024-02-07 PROCEDURE — 1159F MED LIST DOCD IN RCRD: CPT | Performed by: NURSE PRACTITIONER

## 2024-02-07 PROCEDURE — 1157F ADVNC CARE PLAN IN RCRD: CPT | Performed by: NURSE PRACTITIONER

## 2024-02-07 PROCEDURE — 1126F AMNT PAIN NOTED NONE PRSNT: CPT | Performed by: NURSE PRACTITIONER

## 2024-02-07 PROCEDURE — 3077F SYST BP >= 140 MM HG: CPT | Performed by: NURSE PRACTITIONER

## 2024-02-07 PROCEDURE — 1160F RVW MEDS BY RX/DR IN RCRD: CPT | Performed by: NURSE PRACTITIONER

## 2024-02-07 PROCEDURE — 99349 HOME/RES VST EST MOD MDM 40: CPT | Performed by: NURSE PRACTITIONER

## 2024-02-07 PROCEDURE — 3078F DIAST BP <80 MM HG: CPT | Performed by: NURSE PRACTITIONER

## 2024-02-08 VITALS
TEMPERATURE: 97.2 F | RESPIRATION RATE: 18 BRPM | HEART RATE: 71 BPM | DIASTOLIC BLOOD PRESSURE: 62 MMHG | SYSTOLIC BLOOD PRESSURE: 144 MMHG | OXYGEN SATURATION: 96 %

## 2024-02-08 RX ORDER — ERGOCALCIFEROL 1.25 MG/1
1.25 CAPSULE ORAL
Qty: 3 CAPSULE | Refills: 3 | Status: SHIPPED | OUTPATIENT
Start: 2024-02-08 | End: 2025-02-07

## 2024-02-08 NOTE — PROGRESS NOTES
Subjective   Patient ID: Betito Chahal is a 84 y.o. male who presents for Follow-up.    HPI  Patient not able to give any meaningful history, history per caregiver Roz Bethee had called the office on 1/24/2024 reporting hypoglycemia with blood sugars in the 70s and 80s and the glimepiride was discontinued at that point  Roz reports fasting blood sugars in the mid 100s, has not had any more hypoglycemic reactions  Has the freestyle marge system, but Roz has only been checking his blood sugar in the morning and none later in the day  Roz feels like the improved blood sugars are because of some dietary changes she is made    Patient has a dentist appointment later today-she will need to have extensive scaling done as well as several teeth pulled and this will require several appointments with the dentist and with an oral surgeon    Saw ophthalmologist and everything was okay    No falls  No pain issues  Sometimes he does not want to get up, but just wants to lay in bed all day  Is still ambulating in the [place] about once a week    No cough or shortness of breath    Bowels moving well    Podiatrist is scheduled to come on the 15th  Objective   /62   Pulse 71   Temp 36.2 °C (97.2 °F)   Resp 18   SpO2 96%     Lab Results   Component Value Date    WBC 9.2 01/12/2024    HGB 9.8 (L) 01/12/2024    HCT 32.4 (L) 01/12/2024    MCV 79 (L) 01/12/2024     01/12/2024       Chemistry    Lab Results   Component Value Date/Time     01/12/2024 1234    K 4.0 01/12/2024 1234     01/12/2024 1234    CO2 22 01/12/2024 1234    BUN 17 01/12/2024 1234    CREATININE 1.66 (H) 01/12/2024 1234    Lab Results   Component Value Date/Time    CALCIUM 8.2 (L) 01/12/2024 1234    ALKPHOS 79 01/12/2024 1234    AST 11 01/12/2024 1234    ALT 9 (L) 01/12/2024 1234    BILITOT 0.4 01/12/2024 1234        Lab Results   Component Value Date    HGBA1C 9.4 (H) 01/12/2024 1/24/24 vascular ultrasound with arterial duplex with  ANT bilat  CONCLUSIONS:  Right Lower PVR: No evidence of arterial occlusive disease in the right lower extremity at rest. Normal digital perfusion noted. Triphasic flow is noted in the right common femoral artery, right posterior tibial artery and right dorsalis pedis artery. Waveforms may be dampened due to edema/swelling.    Left Lower PVR: No evidence of arterial occlusive disease in the left lower extremity at rest. Normal digital perfusion noted. Triphasic flow is noted in the left common femoral artery, left posterior tibial artery and left dorsalis pedis artery.     Imaging & Doppler Findings:     RIGHT Lower PVR                Pressures Ratios  Right Posterior Tibial (Ankle) 160 mmHg  1.05  Right Dorsalis Pedis (Ankle)   165 mmHg  1.08  Right Digit (Great Toe)        145 mmHg  0.95      LEFT Lower PVR                Pressures Ratios  Left Posterior Tibial (Ankle) 178 mmHg  1.16  Left Dorsalis Pedis (Ankle)   170 mmHg  1.11  Left Digit (Great Toe)        145 mmHg  0.95                        Right     Left  Brachial Pressure 153 mmHg 142 mmHg    Physical Exam  alert, elderly, well groomed and well developed aam in nad  pleasant and cooperative  able to follow commands and answer a few simple questions  Patient was in bed when NP arrived, but was able to get up and ambulate by holding onto furniture to sit in his chair  mm moist  no jvd noted  rrr  bs cta bilat, not labored  protuberant, soft, nt/nd  Left foot and ankle trace edema  Right foot 2+ pedal edema decreasing to 1+ ankle edema  DPs 2+  Several superficial dark spots on his lateral left foot, but no open areas  Assessment/Plan     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, without long-term current use of insulin (CMS/[facility])  -10/11/2023 hemoglobin A1c = 8.8-metformin 1000 mg twice daily was resumed; most recent hemoglobin A1c from 1/12/2024 was 9.4, but when glimepiride 1 mg was added patient began having hypoglycemic  reactions and the glimepiride was discontinued  - Blood sugars well-controlled recently in the mid 100s, but caregiver is only been checking fasting  -Instructed caregiver to check blood sugars several times during the day and keep a log-patient has freestyle marge system so that should be easy to do-minimally check fasting and before dinner  - Hemoglobin A1c every 3 months    Bilateral lower extremity edema  - Stable  - Continue chlorthalidone 25 mg daily and Lasix 20 mg prn-patient has not needed the extra Lasix     Chronic anemia  - 1/12/2024 H/H = 9.8/32.4-stable      Vitamin D deficiency  -10/11/2023 vit D = 33  - Roz reports that she has only been giving the vitamin D 1000 units once a week rather than daily-she expressed that she would rather not have to give the vitamin D as often   -Will change to vitamin D 50,000 units monthly     Benign essential hypertension  - BP slightly on the high side today, but adequately controlled  -Continue diltiazem 240 mg daily  - Continue chlorthalidone 25 mg daily with KCl 10 mEq twice daily     Dyslipidemia  -Well-controlled-continue atorvastatin 40 mg daily     Chronic renal impairment, stage 3b (CMS/HCC)  - 1/12/2024 CR = 1.66, GFR = 41-stable  - Avoid nephrotoxic meds including NSAIDs  - Maintain hydration    Chronic atrial fibrillation (CMS/HCC)  -Rate controlled with diltiazem  - Continue Eliquis 2.5 mg twice daily     Moderate Alzheimer's dementia of other onset without behavioral disturbance, psychotic disturbance, mood disturbance, or anxiety (CMS/HCC)  -Stable without any behavioral issues     Enlarged prostate  - Seems to be voiding okay  - continue tamsulosin 0.8 mg nightly       Dark discoloration of his feet  Caregiver had been concerned about dark discoloration of his feet  - arterial studies were done that indicated adequate arterial supply  - Follow-up by podiatry

## 2024-02-10 NOTE — PATIENT INSTRUCTIONS
Follow-up visit will be planned for 2 to 3 months, but please call if any issues in the meantime    Please check blood sugar in the morning before Mr. Chahal has anything to eat so multiple times later in the day, minimally before supper and keep a log to be evaluated at his next visit

## 2024-03-08 DIAGNOSIS — E87.6 HYPOKALEMIA: ICD-10-CM

## 2024-03-11 DIAGNOSIS — N40.0 BENIGN PROSTATIC HYPERPLASIA WITHOUT LOWER URINARY TRACT SYMPTOMS: ICD-10-CM

## 2024-03-12 DIAGNOSIS — H40.9 GLAUCOMA, UNSPECIFIED GLAUCOMA TYPE, UNSPECIFIED LATERALITY: Primary | ICD-10-CM

## 2024-03-12 RX ORDER — POTASSIUM CHLORIDE 750 MG/1
10 TABLET, EXTENDED RELEASE ORAL 2 TIMES DAILY
Qty: 180 TABLET | Refills: 1 | Status: SHIPPED | OUTPATIENT
Start: 2024-03-12

## 2024-03-12 RX ORDER — TAMSULOSIN HYDROCHLORIDE 0.4 MG/1
0.8 CAPSULE ORAL DAILY
Qty: 180 CAPSULE | Refills: 1 | Status: SHIPPED | OUTPATIENT
Start: 2024-03-12

## 2024-06-07 DIAGNOSIS — I48.20 CHRONIC ATRIAL FIBRILLATION (MULTI): Primary | ICD-10-CM

## 2024-06-07 DIAGNOSIS — E11.29: ICD-10-CM

## 2024-06-07 RX ORDER — METFORMIN HYDROCHLORIDE 1000 MG/1
1000 TABLET ORAL 2 TIMES DAILY
Qty: 180 TABLET | Refills: 1 | Status: SHIPPED | OUTPATIENT
Start: 2024-06-07

## 2024-06-12 DIAGNOSIS — E11.9 TYPE 2 DIABETES MELLITUS WITHOUT COMPLICATION, UNSPECIFIED WHETHER LONG TERM INSULIN USE (MULTI): Primary | ICD-10-CM

## 2024-06-12 DIAGNOSIS — E11.29 CONTROLLED TYPE 2 DIABETES MELLITUS WITH OTHER DIABETIC KIDNEY COMPLICATION, WITHOUT LONG-TERM CURRENT USE OF INSULIN (MULTI): Primary | ICD-10-CM

## 2024-06-12 RX ORDER — SITAGLIPTIN 50 MG/1
50 TABLET, FILM COATED ORAL DAILY
Qty: 30 TABLET | Refills: 1 | Status: SHIPPED | OUTPATIENT
Start: 2024-06-12

## 2024-07-24 ENCOUNTER — OFFICE VISIT (OUTPATIENT)
Dept: GERIATRIC MEDICINE | Facility: CLINIC | Age: 84
End: 2024-07-24
Payer: MEDICARE

## 2024-07-24 DIAGNOSIS — E11.9 TYPE 2 DIABETES MELLITUS TREATED WITHOUT INSULIN (MULTI): ICD-10-CM

## 2024-07-24 DIAGNOSIS — R53.1 WEAKNESS GENERALIZED: Primary | ICD-10-CM

## 2024-07-24 PROCEDURE — 99349 HOME/RES VST EST MOD MDM 40: CPT | Performed by: CLINICAL NURSE SPECIALIST

## 2024-07-24 PROCEDURE — 3077F SYST BP >= 140 MM HG: CPT | Performed by: CLINICAL NURSE SPECIALIST

## 2024-07-24 PROCEDURE — 3078F DIAST BP <80 MM HG: CPT | Performed by: CLINICAL NURSE SPECIALIST

## 2024-07-24 PROCEDURE — 1126F AMNT PAIN NOTED NONE PRSNT: CPT | Performed by: CLINICAL NURSE SPECIALIST

## 2024-07-24 PROCEDURE — 1157F ADVNC CARE PLAN IN RCRD: CPT | Performed by: CLINICAL NURSE SPECIALIST

## 2024-07-24 PROCEDURE — 1159F MED LIST DOCD IN RCRD: CPT | Performed by: CLINICAL NURSE SPECIALIST

## 2024-07-24 ASSESSMENT — PAIN SCALES - GENERAL: PAINLEVEL: 0-NO PAIN

## 2024-07-25 VITALS
SYSTOLIC BLOOD PRESSURE: 150 MMHG | RESPIRATION RATE: 16 BRPM | TEMPERATURE: 98.1 F | DIASTOLIC BLOOD PRESSURE: 64 MMHG | HEART RATE: 76 BPM

## 2024-07-25 NOTE — PROGRESS NOTES
"Reason for visit:  routine follow up home visit    Reason for homebound status: Betito is homebound due to the fact that his is weak overall and has much difficulty getting in and out of a car and it is taxing on his caregiver    HPI: Betito was seen today in his apartment for his routine visit; his significant other was present to assist with ROS. She is not reporting any acute problems other than being concerned that his BS may be a little high.     Chief Complaint: \"I feel good\"    Review of Systems   General: feels fine today, no fever or chills, sleeping; feels rested during day; sedentary generally; appetite is good  Skin: no lesions, bruising, pruritus  Eyes:  adequate near and distant vision with no recent vision changes he thinks but SO says she has to turn on the light more lately so he can see so she is wondering if there has been some vision changes; no eye pain   Ears: no pain, drainage or hearing loss  Nose and sinuses: no drainage or congestion  Mouth and throat: no oral pain or sore throat; no lesions or sores; partial dentures have some rough spots so some trouble chewing, needs to get to dentist; no trouble swallowing; rest of teeth in good condition  Neck: no pain; no decreased ROM; no lumps  Respiratory: no cough, congestion or shortness of breath  Cardiac: no chest pain or palpitations  Gastrointestinal: no abdominal pain, no nausea, vomiting, heartburn, diarrhea or constipation; BM few times every day without straining; no rectal pain or bleeding; no hemorrhoids  Urinary: continent; some leakage, wears briefs; no discomfort with urination; has some frequency and urgency; normal color of urine  Musculoskeletal: denies pain or changes in strength; walks independently with walker but admits he is generally weak and sedentary; swelling in feet due to hanging them over edge of bed at night  Neurologic: decreased long and short term memory per SO but he feels it is stable; no headaches, dizziness or " lightheadedness; no tremors or involuntary movements; no altered sensation; no unilateral weakness  Psychiatric: no depression or anxiety; mood is stable; not interested in doing anything but he is ok with this    Physical Examination   Vitals: Blood pressure 150/64, pulse 76, temperature 36.7 °C (98.1 °F), resp. rate 16.  Unable to get wt due to having carpet in apartment    General: sitting in his bedroom, appears stated age, well nourished, appears comfortable and content; has trouble communicating due to expressive aphasia, answering some appropriately but other times it is nonsense rambling; SO available to chime in  Neurologic: alert, oriented x1 with poor executive function; speech clear but a lot of nonsensical partial statements; facial features symmetrical; tongue midline; no involuntary movements; moderate strength and equal peripheral strength, tone and sensation  Skin: no abnormalities of hair; has fungal toenails; no lesions, discolorations, or excessive dryness other than some dark dry flakes of skin on feet distally  Lymph: no palpable or tender lymph glands; no lymphedema  Head: normocephalic, symmetrical features, no evidence of trauma, no tenderness, no masses  Eyes: no ptosis, conjunctival inflammation or scleral icterus; no corneal opacities or abrasions; PERRLA; EOM intact, no exophthalmos; gross visual acuity and visual fields intact  Ears: normal auricles, auditory canals, tympanic membranes; gross hearing intact bilaterally  Nose: no deformities;  no obstruction, mucous membrane pink w/o bleeding or discharge  Mouth: Lips pink, no lesions or abnormal pigmentations; few natural teeth present and in good overall condition and has partial upper and lower dentures in place; no inflammation or exudate on gums; mucosa pink and moist w/o coating or lesions, no malodor of breath  Respiratory: unlabored; chest symmetrical w/ good rise and fall; regular rhythm and depth, breath sounds equal and clear  throughout  Heart: regular rhythm, rate controlled; 2/6 SM; no rubs and gallops  Abdomen: nondistended, nontenderness, bowel sounds active x4, no masses  Extremities: no deformities, tenderness,; weakly palpable peripheral pulses, no cyanosis, clubbing but has 2+ swelling tops of feet; normal temperature, no varicose veins  Musculoskeletal: no limited joint mobility with good ROM of extremities, no tenderness, effusion, erythema or deformity, no kyphosis or scoliosis  Psych: calm, smiles, attempts to communicate, cooperative; behaviors appropriate    Diagnoses and Plan:   DM2 controlled without insulin and with ckd 3- has freestyle monitor and all BS readings in low 100s to 170 with average 150, but seems very stable; continue Januvia low dose; ckd 3 a possible complications  BLE edema / venous insufficiency - encourage feet in bed and not hanging over; cannot elevate due to sitting in his room most of the day and little space; pt did not want stockings; edema does not seem to be causing any problems; follow with podiatry; on chlorthalidone and kcl  Alzheimers, moderate, without behaviors - stable; no medication which I agree with  BPH with lower urinary symptoms - some leakage and urgency but flomax is effective and appropriate  Unspecified atrail fib - on eliquis and diltiazem; rate stable; regular today; tolerating eliquis  HLD unspecified - on atorvastatin  Anemia, unspec - will get labs since it has been a while; no treatment

## 2024-07-25 NOTE — PATIENT INSTRUCTIONS
Labs in October  Follow up visit in the home after labs  Try to elevate legs during the day or a light compression sock  Continue current medications and blood sugar testing

## 2024-08-05 DIAGNOSIS — N40.0 BENIGN PROSTATIC HYPERPLASIA WITHOUT LOWER URINARY TRACT SYMPTOMS: ICD-10-CM

## 2024-08-08 RX ORDER — TAMSULOSIN HYDROCHLORIDE 0.4 MG/1
0.8 CAPSULE ORAL DAILY
Qty: 60 CAPSULE | Refills: 1 | Status: SHIPPED | OUTPATIENT
Start: 2024-08-08

## 2024-08-13 DIAGNOSIS — I10 BENIGN ESSENTIAL HYPERTENSION: ICD-10-CM

## 2024-08-13 RX ORDER — DILTIAZEM HYDROCHLORIDE 240 MG/1
240 CAPSULE, COATED, EXTENDED RELEASE ORAL DAILY
Qty: 30 CAPSULE | Refills: 1 | OUTPATIENT
Start: 2024-08-13

## 2024-08-13 RX ORDER — DILTIAZEM HYDROCHLORIDE 240 MG/1
240 CAPSULE, COATED, EXTENDED RELEASE ORAL DAILY
Qty: 90 CAPSULE | Refills: 3 | Status: SHIPPED | OUTPATIENT
Start: 2024-08-13

## 2024-08-14 ENCOUNTER — APPOINTMENT (OUTPATIENT)
Dept: OPHTHALMOLOGY | Facility: CLINIC | Age: 84
End: 2024-08-14
Payer: MEDICARE

## 2024-08-14 DIAGNOSIS — H26.492 POSTERIOR CAPSULAR OPACIFICATION OF LEFT EYE, OBSCURING VISION: ICD-10-CM

## 2024-08-14 DIAGNOSIS — H25.811 COMBINED FORMS OF AGE-RELATED CATARACT, RIGHT EYE: ICD-10-CM

## 2024-08-14 DIAGNOSIS — H40.1132 PRIMARY OPEN ANGLE GLAUCOMA (POAG) OF BOTH EYES, MODERATE STAGE: Primary | ICD-10-CM

## 2024-08-14 DIAGNOSIS — H17.9 CORNEAL SCAR, LEFT EYE: ICD-10-CM

## 2024-08-14 DIAGNOSIS — H47.393 OPTIC NERVE CUPPING OF BOTH EYES: ICD-10-CM

## 2024-08-14 DIAGNOSIS — Z96.1 PSEUDOPHAKIA: ICD-10-CM

## 2024-08-14 PROCEDURE — 92004 COMPRE OPH EXAM NEW PT 1/>: CPT | Performed by: OPHTHALMOLOGY

## 2024-08-14 RX ORDER — LATANOPROST 50 UG/ML
1 SOLUTION/ DROPS OPHTHALMIC NIGHTLY
Qty: 2.5 ML | Refills: 3 | Status: SHIPPED | OUTPATIENT
Start: 2024-08-14 | End: 2024-09-13

## 2024-08-14 ASSESSMENT — CUP TO DISC RATIO
OD_RATIO: 0.75
OS_RATIO: 0.75

## 2024-08-14 ASSESSMENT — TONOMETRY
OD_IOP_MMHG: 21
OS_IOP_MMHG: 23
IOP_METHOD: GOLDMANN APPLANATION

## 2024-08-14 ASSESSMENT — REFRACTION_MANIFEST
OS_AXIS: 180
OD_ADD: +2.75
OS_ADD: +2.75
OS_SPHERE: -0.75
OD_AXIS: 180
OS_CYLINDER: -0.25
OD_SPHERE: -1.25
OD_CYLINDER: -0.75

## 2024-08-14 ASSESSMENT — VISUAL ACUITY
METHOD: SNELLEN - LINEAR
OD_PH_SC: 20/50
OS_SC: 20/200
OD_SC: 20/60

## 2024-08-14 ASSESSMENT — ENCOUNTER SYMPTOMS: EYES NEGATIVE: 1

## 2024-08-14 ASSESSMENT — SLIT LAMP EXAM - LIDS
COMMENTS: NORMAL
COMMENTS: NORMAL

## 2024-08-14 ASSESSMENT — EXTERNAL EXAM - RIGHT EYE: OD_EXAM: NORMAL

## 2024-08-14 ASSESSMENT — EXTERNAL EXAM - LEFT EYE: OS_EXAM: NORMAL

## 2024-08-14 NOTE — PROGRESS NOTES
Assessment/Plan   Diagnoses and all orders for this visit:  Primary open angle glaucoma (POAG) of both eyes, moderate stage  Optic nerve cupping of both eyes  -     Referral to Ophthalmology  -     latanoprost (Xalatan) 0.005 % ophthalmic solution; Administer 1 drop into both eyes once daily at bedtime.  -add Latanoprost to glaucoma regimen, both eyes at bedtime  -continue with Timolol both eyes bid  Refer to Glaucoma Service for evaluation and management    Combined forms of age-related cataract, right eye  -cataract discussed with patient and fiance- will think about it    Opacification of posterior capsule left eye  -may benefit from Yag laser     Pseudophakia left eye  continue to monitor    Corneal scar, left eye  continue to monitor  -will get corneal evaluation     Return in  4  month(s) for follow up or sooner if having any problems

## 2024-08-23 DIAGNOSIS — E11.21 CONTROLLED TYPE 2 DIABETES MELLITUS WITH DIABETIC NEPHROPATHY, WITHOUT LONG-TERM CURRENT USE OF INSULIN (MULTI): Primary | ICD-10-CM

## 2024-08-23 RX ORDER — PEN NEEDLE, DIABETIC 31 GX5/16"
NEEDLE, DISPOSABLE MISCELLANEOUS
Qty: 120 EACH | Refills: 3 | Status: SHIPPED | OUTPATIENT
Start: 2024-08-23

## 2024-08-24 DIAGNOSIS — E78.2 HYPERLIPEMIA, MIXED: ICD-10-CM

## 2024-08-24 DIAGNOSIS — R60.0 BILATERAL LOWER EXTREMITY EDEMA: ICD-10-CM

## 2024-08-24 DIAGNOSIS — I10 BENIGN ESSENTIAL HYPERTENSION: ICD-10-CM

## 2024-08-24 DIAGNOSIS — E87.6 HYPOKALEMIA: ICD-10-CM

## 2024-08-25 DIAGNOSIS — E78.2 HYPERLIPEMIA, MIXED: ICD-10-CM

## 2024-08-25 RX ORDER — ATORVASTATIN CALCIUM 40 MG/1
40 TABLET, FILM COATED ORAL NIGHTLY
Qty: 90 TABLET | Refills: 3 | Status: SHIPPED | OUTPATIENT
Start: 2024-08-25 | End: 2024-08-27 | Stop reason: SDUPTHER

## 2024-08-27 RX ORDER — POTASSIUM CHLORIDE 750 MG/1
10 TABLET, EXTENDED RELEASE ORAL 2 TIMES DAILY
Qty: 60 TABLET | Refills: 1 | Status: SHIPPED | OUTPATIENT
Start: 2024-08-27

## 2024-08-27 RX ORDER — CHLORTHALIDONE 25 MG/1
25 TABLET ORAL
Qty: 30 TABLET | Refills: 1 | Status: SHIPPED | OUTPATIENT
Start: 2024-08-27

## 2024-08-27 RX ORDER — ATORVASTATIN CALCIUM 40 MG/1
40 TABLET, FILM COATED ORAL NIGHTLY
Qty: 30 TABLET | Refills: 1 | Status: SHIPPED | OUTPATIENT
Start: 2024-08-27

## 2024-09-03 ENCOUNTER — APPOINTMENT (OUTPATIENT)
Dept: OPHTHALMOLOGY | Facility: CLINIC | Age: 84
End: 2024-09-03
Payer: MEDICARE

## 2024-09-12 ENCOUNTER — APPOINTMENT (OUTPATIENT)
Dept: OPHTHALMOLOGY | Facility: CLINIC | Age: 84
End: 2024-09-12
Payer: MEDICARE

## 2024-09-23 DIAGNOSIS — E55.9 VITAMIN D DEFICIENCY: ICD-10-CM

## 2024-09-23 DIAGNOSIS — N40.0 BENIGN PROSTATIC HYPERPLASIA WITHOUT LOWER URINARY TRACT SYMPTOMS: ICD-10-CM

## 2024-09-23 RX ORDER — ERGOCALCIFEROL 1.25 MG/1
1.25 CAPSULE ORAL
Qty: 3 CAPSULE | Refills: 3 | Status: SHIPPED | OUTPATIENT
Start: 2024-09-23 | End: 2025-09-23

## 2024-09-23 RX ORDER — TAMSULOSIN HYDROCHLORIDE 0.4 MG/1
0.8 CAPSULE ORAL DAILY
Qty: 60 CAPSULE | Refills: 1 | Status: SHIPPED | OUTPATIENT
Start: 2024-09-23

## 2024-10-11 ENCOUNTER — LAB (OUTPATIENT)
Dept: LAB | Facility: LAB | Age: 84
End: 2024-10-11
Payer: MEDICARE

## 2024-10-11 ENCOUNTER — HOME CARE VISIT (OUTPATIENT)
Dept: HOME HEALTH SERVICES | Facility: HOME HEALTH | Age: 84
End: 2024-10-11

## 2024-10-11 DIAGNOSIS — E11.9 TYPE 2 DIABETES MELLITUS TREATED WITHOUT INSULIN (MULTI): ICD-10-CM

## 2024-10-11 DIAGNOSIS — R53.1 WEAKNESS GENERALIZED: ICD-10-CM

## 2024-10-11 LAB
ALBUMIN SERPL BCP-MCNC: 3.2 G/DL (ref 3.4–5)
ALP SERPL-CCNC: 95 U/L (ref 33–136)
ALT SERPL W P-5'-P-CCNC: 9 U/L (ref 10–52)
ANION GAP SERPL CALC-SCNC: 18 MMOL/L (ref 10–20)
AST SERPL W P-5'-P-CCNC: 13 U/L (ref 9–39)
BILIRUB SERPL-MCNC: 0.4 MG/DL (ref 0–1.2)
BUN SERPL-MCNC: 13 MG/DL (ref 6–23)
CALCIUM SERPL-MCNC: 8.4 MG/DL (ref 8.6–10.3)
CHLORIDE SERPL-SCNC: 99 MMOL/L (ref 98–107)
CO2 SERPL-SCNC: 26 MMOL/L (ref 21–32)
CREAT SERPL-MCNC: 1.61 MG/DL (ref 0.5–1.3)
EGFRCR SERPLBLD CKD-EPI 2021: 42 ML/MIN/1.73M*2
ERYTHROCYTE [DISTWIDTH] IN BLOOD BY AUTOMATED COUNT: 18.6 % (ref 11.5–14.5)
EST. AVERAGE GLUCOSE BLD GHB EST-MCNC: 237 MG/DL
GLUCOSE SERPL-MCNC: 292 MG/DL (ref 74–99)
HBA1C MFR BLD: 9.9 %
HCT VFR BLD AUTO: 34.6 % (ref 41–52)
HGB BLD-MCNC: 10.4 G/DL (ref 13.5–17.5)
MCH RBC QN AUTO: 23.9 PG (ref 26–34)
MCHC RBC AUTO-ENTMCNC: 30.1 G/DL (ref 32–36)
MCV RBC AUTO: 80 FL (ref 80–100)
NRBC BLD-RTO: 0 /100 WBCS (ref 0–0)
PLATELET # BLD AUTO: 387 X10*3/UL (ref 150–450)
POTASSIUM SERPL-SCNC: 4.3 MMOL/L (ref 3.5–5.3)
PROT SERPL-MCNC: 6 G/DL (ref 6.4–8.2)
RBC # BLD AUTO: 4.35 X10*6/UL (ref 4.5–5.9)
SODIUM SERPL-SCNC: 139 MMOL/L (ref 136–145)
WBC # BLD AUTO: 7.7 X10*3/UL (ref 4.4–11.3)

## 2024-10-11 PROCEDURE — 85027 COMPLETE CBC AUTOMATED: CPT

## 2024-10-11 PROCEDURE — 80053 COMPREHEN METABOLIC PANEL: CPT

## 2024-10-11 PROCEDURE — 36415 COLL VENOUS BLD VENIPUNCTURE: CPT

## 2024-10-11 PROCEDURE — 83036 HEMOGLOBIN GLYCOSYLATED A1C: CPT

## 2024-10-22 DIAGNOSIS — R60.0 BILATERAL LOWER EXTREMITY EDEMA: ICD-10-CM

## 2024-10-22 DIAGNOSIS — I10 BENIGN ESSENTIAL HYPERTENSION: ICD-10-CM

## 2024-10-22 DIAGNOSIS — E87.6 HYPOKALEMIA: ICD-10-CM

## 2024-10-22 DIAGNOSIS — E78.2 HYPERLIPEMIA, MIXED: ICD-10-CM

## 2024-10-24 RX ORDER — CHLORTHALIDONE 25 MG/1
25 TABLET ORAL
Qty: 30 TABLET | Refills: 1 | Status: SHIPPED | OUTPATIENT
Start: 2024-10-24

## 2024-10-24 RX ORDER — ATORVASTATIN CALCIUM 40 MG/1
40 TABLET, FILM COATED ORAL NIGHTLY
Qty: 30 TABLET | Refills: 1 | Status: SHIPPED | OUTPATIENT
Start: 2024-10-24

## 2024-10-24 RX ORDER — POTASSIUM CHLORIDE 750 MG/1
10 TABLET, EXTENDED RELEASE ORAL 2 TIMES DAILY
Qty: 60 TABLET | Refills: 1 | Status: SHIPPED | OUTPATIENT
Start: 2024-10-24

## 2024-11-05 ENCOUNTER — OFFICE VISIT (OUTPATIENT)
Dept: GERIATRIC MEDICINE | Facility: CLINIC | Age: 84
End: 2024-11-05
Payer: MEDICARE

## 2024-11-05 VITALS
RESPIRATION RATE: 16 BRPM | SYSTOLIC BLOOD PRESSURE: 136 MMHG | TEMPERATURE: 97 F | DIASTOLIC BLOOD PRESSURE: 70 MMHG | HEART RATE: 72 BPM

## 2024-11-05 DIAGNOSIS — R53.81 PHYSICAL DEBILITY: ICD-10-CM

## 2024-11-05 DIAGNOSIS — N18.32 CHRONIC RENAL IMPAIRMENT, STAGE 3B (MULTI): ICD-10-CM

## 2024-11-05 DIAGNOSIS — E11.65 TYPE 2 DIABETES MELLITUS WITH HYPERGLYCEMIA, WITHOUT LONG-TERM CURRENT USE OF INSULIN: Primary | ICD-10-CM

## 2024-11-05 DIAGNOSIS — E78.5 DYSLIPIDEMIA: ICD-10-CM

## 2024-11-05 DIAGNOSIS — M17.0 PRIMARY OSTEOARTHRITIS OF BOTH KNEES: ICD-10-CM

## 2024-11-05 DIAGNOSIS — I73.9 PVD (PERIPHERAL VASCULAR DISEASE) (CMS-HCC): ICD-10-CM

## 2024-11-05 DIAGNOSIS — F02.A0 MILD LATE ONSET ALZHEIMER'S DEMENTIA WITHOUT BEHAVIORAL DISTURBANCE, PSYCHOTIC DISTURBANCE, MOOD DISTURBANCE, OR ANXIETY (MULTI): ICD-10-CM

## 2024-11-05 DIAGNOSIS — G30.1 MILD LATE ONSET ALZHEIMER'S DEMENTIA WITHOUT BEHAVIORAL DISTURBANCE, PSYCHOTIC DISTURBANCE, MOOD DISTURBANCE, OR ANXIETY (MULTI): ICD-10-CM

## 2024-11-05 DIAGNOSIS — I10 BENIGN ESSENTIAL HYPERTENSION: ICD-10-CM

## 2024-11-05 DIAGNOSIS — I48.91 ATRIAL FIBRILLATION, UNSPECIFIED TYPE (MULTI): ICD-10-CM

## 2024-11-05 DIAGNOSIS — D64.9 CHRONIC ANEMIA: ICD-10-CM

## 2024-11-05 PROBLEM — E11.9 DIABETES MELLITUS TYPE 2, CONTROLLED (MULTI): Status: RESOLVED | Noted: 2023-03-18 | Resolved: 2024-11-05

## 2024-11-05 PROBLEM — I99.9 DECREASED CIRCULATION: Status: RESOLVED | Noted: 2023-03-18 | Resolved: 2024-11-05

## 2024-11-05 PROBLEM — F09 COGNITIVE DYSFUNCTION: Status: RESOLVED | Noted: 2023-03-18 | Resolved: 2024-11-05

## 2024-11-05 PROCEDURE — 3075F SYST BP GE 130 - 139MM HG: CPT | Performed by: CLINICAL NURSE SPECIALIST

## 2024-11-05 PROCEDURE — 3078F DIAST BP <80 MM HG: CPT | Performed by: CLINICAL NURSE SPECIALIST

## 2024-11-05 PROCEDURE — 99349 HOME/RES VST EST MOD MDM 40: CPT | Performed by: CLINICAL NURSE SPECIALIST

## 2024-11-05 PROCEDURE — 1036F TOBACCO NON-USER: CPT | Performed by: CLINICAL NURSE SPECIALIST

## 2024-11-05 PROCEDURE — 1157F ADVNC CARE PLAN IN RCRD: CPT | Performed by: CLINICAL NURSE SPECIALIST

## 2024-11-05 PROCEDURE — 1159F MED LIST DOCD IN RCRD: CPT | Performed by: CLINICAL NURSE SPECIALIST

## 2024-11-05 RX ORDER — GLIPIZIDE 5 MG/1
5 TABLET ORAL DAILY
Qty: 90 TABLET | Refills: 3 | Status: SHIPPED | OUTPATIENT
Start: 2024-11-05 | End: 2025-11-05

## 2024-11-05 RX ORDER — METFORMIN HYDROCHLORIDE 1000 MG/1
1000 TABLET ORAL
COMMUNITY

## 2024-11-05 RX ORDER — LATANOPROST 50 UG/ML
1 SOLUTION/ DROPS OPHTHALMIC NIGHTLY
COMMUNITY

## 2024-11-05 NOTE — PROGRESS NOTES
"Reason for visit:  routine follow up home visit    Reason for homebound status: Betito is homebound due to the fact that his is weak and has advancing dementia and has much difficulty getting in and out of a car, follow instructions, and it is taxing on his caregiver    HPI: Betito was seen today in his apartment for his routine visit; his significant other was present to assist with ROS. She is not reporting any acute problems     Chief Complaint: \"I feel good\"    Review of Systems   General: feels fine today, no fever or chills, sleeps all night; feels rested during day; sedentary, staying in his room; appetite is good, does not follow a diabetic diet  Skin: no lesions, bruising, pruritus  Eyes:  adequate near and distant vision with no recent vision changes he thinks but recent exam and had additional eye drop added; no eye pain or drainage or irritation; no ear drainage or hearing loss; no sinus drainage or congestion;  no oral pain or sore throat; no lesions or sores; was at dentist recently and has another appt for gingivitis; no trouble swallowing; teeth in decent condition  Neck: no pain; no decreased ROM; no lumps  Respiratory: no cough, congestion or shortness of breath  Cardiac: no chest pain or palpitations  Gastrointestinal: no abdominal pain, no nausea, vomiting, heartburn, diarrhea or constipation; BM few times every day without straining; no rectal pain or bleeding; no hemorrhoids  Urinary: continent; some leakage, wears briefs; no discomfort with urination; has some frequency and urgency; normal color of urine  Musculoskeletal: denies pain or changes in strength; walks independently with walker but admits he is generally weak and sedentary; holds onto bed to walk to walker then walks to bathroom; doesn't do any more than that all day  Neurologic: decreased long and short term memory per SO but he feels it is stable; no headaches, dizziness or lightheadedness; no tremors or involuntary movements; no " "altered sensation; no unilateral weakness  Psychiatric: no depression or anxiety; mood is stable; not interested in doing anything but he is ok with this      Chemistry    Lab Results   Component Value Date/Time     10/11/2024 1054    K 4.3 10/11/2024 1054    CL 99 10/11/2024 1054    CO2 26 10/11/2024 1054    BUN 13 10/11/2024 1054    CREATININE 1.61 (H) 10/11/2024 1054    Lab Results   Component Value Date/Time    CALCIUM 8.4 (L) 10/11/2024 1054    ALKPHOS 95 10/11/2024 1054    AST 13 10/11/2024 1054    ALT 9 (L) 10/11/2024 1054    BILITOT 0.4 10/11/2024 1054        Lab Results   Component Value Date    WBC 7.7 10/11/2024    HGB 10.4 (L) 10/11/2024    HCT 34.6 (L) 10/11/2024    MCV 80 10/11/2024     10/11/2024     Lab Results   Component Value Date    CHOL 108 06/23/2023    CHOL Canceled 03/13/2023    CHOL 140 07/26/2022     Lab Results   Component Value Date    HDL 38.2 (A) 06/23/2023    HDL Canceled 03/13/2023    HDL 47.1 07/26/2022     No results found for: \"LDLCALC\"  Lab Results   Component Value Date    TRIG Canceled 03/13/2023    TRIG 84 07/26/2022    TRIG 85 12/28/2021     Lab Results   Component Value Date    HGBA1C 9.9 (H) 10/11/2024       Physical Examination   Vitals: Blood pressure 136/70, pulse 72, temperature 36.1 °C (97 °F), resp. rate 16.  Pulse ox 97%  Unable to get wt due to having carpet in apartment  General: sitting in his bedroom, appears stated age, well nourished, appears comfortable and content; has trouble communicating due to expressive aphasia, answering some appropriately but other times it is nonsense rambling; SO available to chime in but she tends to stay out of the room while I am there  Neurologic: alert, oriented x2 with poor executive function; very little short term memory; speech clear but a lot of nonsensical partial statements, word finding issues; facial features symmetrical; tongue midline; no involuntary movements; moderate strength and equal peripheral " strength, tone and sensation  Skin: no abnormalities of hair; has rough darkened toenails; no lesions, discolorations, or excessive dryness other than some dark dry flakes of skin on feet distally  HEENT: normocephalic, symmetrical features, no evidence of trauma, no tenderness; no ptosis, conjunctival inflammation or scleral icterus; vision seems adequate for ADLs; hearing as well adequate at close distance; no ear drainage or irritation; no nasal drainage or obvious congestion; Lips pink, mucosa pink and moist w/o coating or lesions, no malodor of breath; teeth appear in average condition  Respiratory: unlabored; chest symmetrical w/ good rise and fall; regular rhythm and depth, breath sounds equal and clear throughout  Heart: regular rhythm, rate controlled; 2/6 murmur (diastolic?); no rubs and gallops  Abdomen: nondistended, nontenderness, bowel sounds active x4, no masses  Extremities: no deformities, tenderness,; weakly palpable peripheral pulses, no cyanosis or clubbing; 1+ edema of feet only; normal temperature, no varicose veins  Musculoskeletal: no limited joint mobility with decent ROM of extremities, no tenderness, effusion, erythema or deformity, moderate thoracic kyphosis but no scoliosis  Psych: calm, smiles, attempts to communicate, cooperative; behaviors appropriate    Diagnoses and Plan:   1. Type 2 diabetes mellitus with hyperglycemia, without long-term current use of insulin (Primary)  - continue metformin, was not listed previously on his list but he has been on it a long time; add glipiZIDE (Glucotrol) 5 mg once daily due to elevated A1c; home readings sparse, maybe 3 or 4 times a week - 196, 169, 151, 346, 269, 186; discussed with wife; she says he eats fruit every day but otherwise little sweets; suggested no diet change for now; will get A1c in 3 months    2. Atrial fibrillation, unspecified type (Multi)  Regular today but does take diltiazem and eliquis    3. Benign essential  hypertension  Diltiazem only; BP wnl    4. Dyslipidemia  Did not get lipid panel with recent labs; ok to continue atorvastatin and will check lipid panel with labs in 3 months    5. PVD (peripheral vascular disease) (CMS-HCC)  No acute concerns    6. Chronic renal impairment, stage 3b (Multi)  Stable; gfr 40; discussed with wife    7. Chronic anemia  Hgb decent with last draw; no treatment    8. Primary osteoarthritis of both knees  Makes it so he cannot stand upright and walks slow; uses walker; no recent falls; denies need for pain meds    9. Mild late onset Alzheimer's dementia without behavioral disturbance, psychotic disturbance, mood disturbance, or anxiety (Multi)  Stable; no obvious decline; no treatment    10. Physical debility  Would benefit from PT; offered but pt declined; will check back next visit

## 2024-11-06 ENCOUNTER — HOME CARE VISIT (OUTPATIENT)
Dept: HOME HEALTH SERVICES | Facility: HOME HEALTH | Age: 84
End: 2024-11-06

## 2024-11-06 ENCOUNTER — LAB (OUTPATIENT)
Dept: LAB | Facility: LAB | Age: 84
End: 2024-11-06
Payer: MEDICARE

## 2024-11-06 DIAGNOSIS — E11.65 TYPE 2 DIABETES MELLITUS WITH HYPERGLYCEMIA, WITHOUT LONG-TERM CURRENT USE OF INSULIN: ICD-10-CM

## 2024-11-06 LAB
CHOLEST SERPL-MCNC: 127 MG/DL (ref 0–199)
CHOLESTEROL/HDL RATIO: 3.3
EST. AVERAGE GLUCOSE BLD GHB EST-MCNC: 232 MG/DL
HBA1C MFR BLD: 9.7 %
HDLC SERPL-MCNC: 38.2 MG/DL
LDLC SERPL CALC-MCNC: 64 MG/DL
NON HDL CHOLESTEROL: 89 MG/DL (ref 0–149)
TRIGL SERPL-MCNC: 126 MG/DL (ref 0–149)
VLDL: 25 MG/DL (ref 0–40)

## 2024-11-06 PROCEDURE — 83036 HEMOGLOBIN GLYCOSYLATED A1C: CPT

## 2024-11-06 PROCEDURE — 36415 COLL VENOUS BLD VENIPUNCTURE: CPT

## 2024-11-06 PROCEDURE — 80061 LIPID PANEL: CPT

## 2024-11-07 ENCOUNTER — DOCUMENTATION (OUTPATIENT)
Dept: NEUROLOGY | Facility: CLINIC | Age: 84
End: 2024-11-07
Payer: MEDICARE

## 2024-11-07 NOTE — PROGRESS NOTES
"Call from Roz Heath that patient is voiding moreso than usual. \"Urinating more in a brief than he is going to the restroom\" to void. Wanted to know what can be done to alleviate some of this. \"Having to change him everyday is really a bit much.\" Message sent to Nella Lyon CNP via secure chat.    "

## 2024-11-14 DIAGNOSIS — E11.21 CONTROLLED TYPE 2 DIABETES MELLITUS WITH DIABETIC NEPHROPATHY, WITHOUT LONG-TERM CURRENT USE OF INSULIN: ICD-10-CM

## 2024-11-14 DIAGNOSIS — N40.0 BENIGN PROSTATIC HYPERPLASIA WITHOUT LOWER URINARY TRACT SYMPTOMS: ICD-10-CM

## 2024-11-14 DIAGNOSIS — I48.20 CHRONIC ATRIAL FIBRILLATION (MULTI): ICD-10-CM

## 2024-11-14 DIAGNOSIS — H40.1132 PRIMARY OPEN ANGLE GLAUCOMA (POAG) OF BOTH EYES, MODERATE STAGE: Primary | ICD-10-CM

## 2024-11-14 DIAGNOSIS — E11.29 CONTROLLED TYPE 2 DIABETES MELLITUS WITH OTHER DIABETIC KIDNEY COMPLICATION, WITHOUT LONG-TERM CURRENT USE OF INSULIN: ICD-10-CM

## 2024-11-15 ENCOUNTER — TELEPHONE (OUTPATIENT)
Dept: GERIATRIC MEDICINE | Facility: CLINIC | Age: 84
End: 2024-11-15
Payer: MEDICARE

## 2024-11-15 RX ORDER — TAMSULOSIN HYDROCHLORIDE 0.4 MG/1
0.8 CAPSULE ORAL DAILY
Qty: 60 CAPSULE | Refills: 1 | OUTPATIENT
Start: 2024-11-15 | End: 2025-01-14

## 2024-11-15 RX ORDER — SITAGLIPTIN 50 MG/1
50 TABLET, FILM COATED ORAL DAILY
Qty: 30 TABLET | Refills: 1 | OUTPATIENT
Start: 2024-11-15

## 2024-11-15 RX ORDER — APIXABAN 2.5 MG/1
2.5 TABLET, FILM COATED ORAL 2 TIMES DAILY
Qty: 60 TABLET | Refills: 1 | OUTPATIENT
Start: 2024-11-15

## 2024-11-15 RX ORDER — TAMSULOSIN HYDROCHLORIDE 0.4 MG/1
0.8 CAPSULE ORAL DAILY
Qty: 60 CAPSULE | Refills: 1 | OUTPATIENT
Start: 2024-11-15

## 2024-11-15 NOTE — TELEPHONE ENCOUNTER
Patients caregiver called saying she is concerned that the patients blood sugar is too high at 245.   Message to Nella Lyon CNP. Per Nella, order for glipizide sent to pharmacy. Spoke with Roz, Roz states she has picked up the medication and started administration this morning.

## 2024-11-16 RX ORDER — PEN NEEDLE, DIABETIC 31 GX5/16"
NEEDLE, DISPOSABLE MISCELLANEOUS
Qty: 100 EACH | Refills: 3 | Status: SHIPPED | OUTPATIENT
Start: 2024-11-16

## 2024-11-19 DIAGNOSIS — E11.29 CONTROLLED TYPE 2 DIABETES MELLITUS WITH OTHER DIABETIC KIDNEY COMPLICATION, WITHOUT LONG-TERM CURRENT USE OF INSULIN: ICD-10-CM

## 2024-11-19 DIAGNOSIS — N40.0 BENIGN PROSTATIC HYPERPLASIA WITHOUT LOWER URINARY TRACT SYMPTOMS: ICD-10-CM

## 2024-11-19 RX ORDER — LATANOPROST 50 UG/ML
1 SOLUTION/ DROPS OPHTHALMIC NIGHTLY
Qty: 2.5 ML | Refills: 3 | Status: SHIPPED | OUTPATIENT
Start: 2024-11-19

## 2024-11-19 RX ORDER — TAMSULOSIN HYDROCHLORIDE 0.4 MG/1
0.8 CAPSULE ORAL DAILY
Qty: 60 CAPSULE | Refills: 1 | Status: SHIPPED | OUTPATIENT
Start: 2024-11-19

## 2024-11-20 DIAGNOSIS — E11.29 CONTROLLED TYPE 2 DIABETES MELLITUS WITH OTHER DIABETIC KIDNEY COMPLICATION, WITHOUT LONG-TERM CURRENT USE OF INSULIN: ICD-10-CM

## 2024-11-20 DIAGNOSIS — I48.20 CHRONIC ATRIAL FIBRILLATION (MULTI): ICD-10-CM

## 2024-11-21 RX ORDER — FLASH GLUCOSE SENSOR
KIT MISCELLANEOUS
Qty: 100 EACH | Refills: 0 | Status: SHIPPED | OUTPATIENT
Start: 2024-11-21

## 2024-11-22 DIAGNOSIS — E11.29 CONTROLLED TYPE 2 DIABETES MELLITUS WITH OTHER DIABETIC KIDNEY COMPLICATION, WITHOUT LONG-TERM CURRENT USE OF INSULIN: ICD-10-CM

## 2024-11-26 ENCOUNTER — DOCUMENTATION (OUTPATIENT)
Dept: GERIATRIC MEDICINE | Facility: CLINIC | Age: 84
End: 2024-11-26
Payer: MEDICARE

## 2024-11-26 NOTE — PROGRESS NOTES
"Wife called stating that since pt started taking his \"new medication\", he has been having diarrhea and stool incontinence. Nella Lyon NP notified, stated for pt to stop taking the medication. Wife was made aware of NP's statement, stated \"okay\".   "

## 2024-11-27 ENCOUNTER — TELEPHONE (OUTPATIENT)
Dept: GERIATRIC MEDICINE | Facility: CLINIC | Age: 84
End: 2024-11-27
Payer: MEDICARE

## 2024-11-27 ENCOUNTER — PATIENT MESSAGE (OUTPATIENT)
Dept: GERIATRIC MEDICINE | Facility: CLINIC | Age: 84
End: 2024-11-27
Payer: MEDICARE

## 2024-11-27 NOTE — TELEPHONE ENCOUNTER
Wife called office and stated glipizide caused diarrhea so I told her to stop it. Will review med list and will call her to discuss options for elevated BS

## 2024-11-29 DIAGNOSIS — E11.65 TYPE 2 DIABETES MELLITUS WITH HYPERGLYCEMIA, WITHOUT LONG-TERM CURRENT USE OF INSULIN: Primary | ICD-10-CM

## 2024-12-03 ENCOUNTER — APPOINTMENT (OUTPATIENT)
Dept: GERIATRIC MEDICINE | Facility: CLINIC | Age: 84
End: 2024-12-03
Payer: MEDICARE

## 2024-12-03 RX ORDER — SITAGLIPTIN 50 MG/1
50 TABLET, FILM COATED ORAL DAILY
Qty: 30 TABLET | Refills: 1 | Status: SHIPPED | OUTPATIENT
Start: 2024-12-03

## 2024-12-06 ENCOUNTER — OFFICE VISIT (OUTPATIENT)
Dept: GERIATRIC MEDICINE | Facility: CLINIC | Age: 84
End: 2024-12-06
Payer: MEDICARE

## 2024-12-06 DIAGNOSIS — N40.0 BENIGN PROSTATIC HYPERPLASIA WITHOUT LOWER URINARY TRACT SYMPTOMS: ICD-10-CM

## 2024-12-06 DIAGNOSIS — I10 BENIGN ESSENTIAL HYPERTENSION: ICD-10-CM

## 2024-12-06 DIAGNOSIS — R53.81 PHYSICAL DEBILITY: ICD-10-CM

## 2024-12-06 DIAGNOSIS — R60.0 BILATERAL LOWER EXTREMITY EDEMA: ICD-10-CM

## 2024-12-06 DIAGNOSIS — N18.32 CHRONIC RENAL IMPAIRMENT, STAGE 3B (MULTI): ICD-10-CM

## 2024-12-06 DIAGNOSIS — E11.65 TYPE 2 DIABETES MELLITUS WITH HYPERGLYCEMIA, WITHOUT LONG-TERM CURRENT USE OF INSULIN: Primary | ICD-10-CM

## 2024-12-06 RX ORDER — TAMSULOSIN HYDROCHLORIDE 0.4 MG/1
0.4 CAPSULE ORAL 2 TIMES DAILY
Qty: 60 CAPSULE | Refills: 1 | Status: SHIPPED | OUTPATIENT
Start: 2024-12-06

## 2024-12-06 RX ORDER — CHLORTHALIDONE 25 MG/1
12.5 TABLET ORAL
Qty: 30 TABLET | Refills: 1 | Status: SHIPPED | OUTPATIENT
Start: 2024-12-06

## 2024-12-07 VITALS
SYSTOLIC BLOOD PRESSURE: 120 MMHG | TEMPERATURE: 97.5 F | RESPIRATION RATE: 16 BRPM | HEART RATE: 68 BPM | DIASTOLIC BLOOD PRESSURE: 64 MMHG

## 2024-12-07 NOTE — PROGRESS NOTES
"Reason for visit:  follow up home visit for DM2    Reason for homebound status: Betito is homebound due to the fact that his is weak and has advancing dementia and has much difficulty getting in and out of a car, follow instructions, and it is taxing on his caregiver    HPI: Betito was seen today in his apartment for his routine visit; his significant other Roz was present to assist with ROS. She is reporting Betito has declined physically and now may go to the bathroom across the [address] once a day but otherwise he sits in the chair or lays in bed and is incontinent, making her spend a lot of time cleaning sheets, the floor and chairs, as well as cleaning him up. He is not involved in his ADLs anymore.     Chief Complaint: \"I feel good\"    Review of Systems   General: feels fine today, no fever or chills, sleeps all night in bed, about 6 hrs; feels rested during day but does doze at times; sedentary, staying in his room; appetite is good, does not follow a diabetic diet  Skin: no lesions, bruising, pruritus; no skin irritation from incontinence; SO says he lost interest in ADLs  Eyes:  adequate near and distant vision with no recent vision changes he thinks but recent exam and had additional eye drop added; no eye pain or drainage or irritation; no ear drainage or hearing loss; no sinus drainage or congestion;  no oral pain or sore throat; no lesions or sores; was at dentist recently and has another appt for gingivitis; no trouble swallowing; teeth in decent condition  Neck: no pain; no decreased ROM  Respiratory: no cough, congestion or shortness of breath  Cardiac: no chest pain or palpitations  Gastrointestinal: no abdominal pain, no nausea, vomiting, heartburn, diarrhea or constipation; BM few times every day but often incontinent now; new diabetic med was stopped due to increased stools  Urinary: incontinent all the time now, does not get up to go to the bathroom; wears briefs; no discomfort with urination;; normal " color of urine  Musculoskeletal: denies pain or changes in strength; walks independently with walker but admits he is generally weak and sedentary; holds onto bed to walk to walker then walks to bathroom; has only been getting up once a day to go the the bathroom   Neurologic: decreased long and short term memory per SO but he feels it is stable; no headaches, dizziness or lightheadedness; no tremors or involuntary movements; no altered sensation; no unilateral weakness  Psychiatric: no depression or anxiety; mood is stable; not interested in doing anything but he is ok with this; SO says he is not behavioral    Physical Examination   Vitals: Blood pressure 120/64, pulse 68, temperature 36.4 °C (97.5 °F), resp. rate 16.  Pulse ox 97%  Unable to get wt due to having carpet in apartment  General: lying across his bed sideways, well nourished, appears comfortable and content; has trouble communicating due to expressive aphasia, answering some appropriately; SO stayed with us this time and answered questions  Neurologic: alert, oriented x2 minimal insight; very little short term memory; speech clear but brief; facial features symmetrical; tongue midline; no involuntary movements; moderate strength and equal peripheral strength, tone and sensation  Skin: no abnormalities of hair; has rough darkened toenails; no lesions, discolorations, or excessive dryness other than some dark dry flakes of skin on feet distally  HEENT: normocephalic, symmetrical features, no evidence of trauma, no tenderness; no ptosis, conjunctival inflammation or scleral icterus; vision seems adequate for ADLs; hearing as well adequate at close distance; no ear drainage or irritation; no nasal drainage or obvious congestion; Lips pink, mucosa pink and moist w/o coating or lesions, no malodor of breath; teeth appear in average condition  Respiratory: unlabored; chest symmetrical w/ good rise and fall; regular rhythm and depth, breath sounds equal and  clear throughout  Heart: regular rhythm, rate controlled; 2/6 murmur (diastolic?); no rubs and gallops  Abdomen: nondistended, nontenderness, bowel sounds active x4, no masses  Extremities: no deformities, tenderness,; weakly palpable peripheral pulses, no cyanosis or clubbing; 1+ edema of feet only; normal temperature, no varicose veins  Musculoskeletal: no limited joint mobility with decent ROM of extremities, no tenderness, effusion, erythema or deformity, moderate thoracic kyphosis but no scoliosis  Psych: calm, smiles, attempts to communicate, cooperative; behaviors appropriate    Diagnoses and Plan:   1. Type 2 diabetes mellitus with hyperglycemia, without long-term current use of insulin (Primary)  - continue metformin, was not listed previously on his list but he has been on it a long time; glipizide caused diarrhea so stopped; metformin dose is too high but will keep as is due to high BS and clinical pharmacist working on adding Jardiance and possibly Trulicity; talked with Roz about giving the injection and she said she could do it.   2. Bowel and bladder incontinence - explained to wife to ask him to go to the bathroom every 4 hrs since she hasn't been doing this; she says he can walk and she does not have to help him much; this would help at least to decrease the amount of urine and stool she has to clean up, she is very stressed; also recommended changing to decaf coffee since he drinks few cups per day  2. Atrial fibrillation, unspecified type (Multi)  Regular today but does take diltiazem and eliquis  3. Benign essential hypertension  Diltiazem only; BP wnl  4. Dyslipidemia  Did not get lipid panel with recent labs; ok to continue atorvastatin and will check lipid panel with labs in 3 months  5. PVD (peripheral vascular disease) (CMS-[facility])  No acute concerns  6. Chronic renal impairment, stage 3b (Multi)  Stable; gfr 40  7. Chronic anemia  Hgb decent with last draw; no treatment  8. Primary  osteoarthritis of both knees  Makes it so he cannot stand upright and walks slow; uses walker; no recent falls; denies need for pain meds  9. 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 Physical debility  Would benefit from PT; offered and she accepted so will order; may also need a hospital bed due to his inability to turn himself in bed to prevent pressure ulcers; wife to let me know if she will buy a new bed and consider hospital bed  11. BPH with incontinence - found that she was giving him one flomax in am and 2 at hs. Not sure how this dosage happened but I dont see it in his orders this way. Told her to change to one bid.     The patient would benefit from a hospital bed due to advancing dementia with poor awareness of lack of turning in bed. Patient  requires a hospital bed for ease of repositioning by wife that is not feasible in a standard bed due to her advanced age and his inability to assist with repositioning.  Without a hospital bed this patient is at an increased risk of increase of skin break down that would cause further medical attention.

## 2024-12-09 DIAGNOSIS — G30.1: Primary | ICD-10-CM

## 2024-12-09 DIAGNOSIS — F02.80: Primary | ICD-10-CM

## 2024-12-09 NOTE — PROGRESS NOTES
Wife called tonight. She is very stressed and would like a 2 week respite at a NH for Betito. He had gone 2 yrs ago after going to the ED for weakness and she thought it was an option to send him directly. I explained insurance of his type does not typically cover that but that a hospice consultation may be helpful to her. She agreed. Sending referral to [facility].

## 2024-12-11 ENCOUNTER — APPOINTMENT (OUTPATIENT)
Dept: OPHTHALMOLOGY | Facility: CLINIC | Age: 84
End: 2024-12-11
Payer: MEDICARE

## 2024-12-13 DIAGNOSIS — I10 BENIGN ESSENTIAL HYPERTENSION: ICD-10-CM

## 2024-12-13 DIAGNOSIS — E78.2 HYPERLIPEMIA, MIXED: ICD-10-CM

## 2024-12-13 DIAGNOSIS — R60.0 BILATERAL LOWER EXTREMITY EDEMA: ICD-10-CM

## 2024-12-13 DIAGNOSIS — E87.6 HYPOKALEMIA: ICD-10-CM

## 2024-12-15 RX ORDER — POTASSIUM CHLORIDE 750 MG/1
10 TABLET, EXTENDED RELEASE ORAL 2 TIMES DAILY
Qty: 60 TABLET | Refills: 1 | Status: SHIPPED | OUTPATIENT
Start: 2024-12-15

## 2024-12-15 RX ORDER — ATORVASTATIN CALCIUM 40 MG/1
40 TABLET, FILM COATED ORAL NIGHTLY
Qty: 30 TABLET | Refills: 1 | Status: SHIPPED | OUTPATIENT
Start: 2024-12-15

## 2024-12-15 RX ORDER — CHLORTHALIDONE 25 MG/1
25 TABLET ORAL
Qty: 30 TABLET | Refills: 1 | Status: SHIPPED | OUTPATIENT
Start: 2024-12-15

## 2024-12-16 DIAGNOSIS — E11.65 TYPE 2 DIABETES MELLITUS WITH HYPERGLYCEMIA, WITHOUT LONG-TERM CURRENT USE OF INSULIN: Primary | ICD-10-CM

## 2024-12-17 RX ORDER — METFORMIN HYDROCHLORIDE 1000 MG/1
1000 TABLET ORAL
Qty: 180 TABLET | Refills: 3 | Status: SHIPPED | OUTPATIENT
Start: 2024-12-17 | End: 2025-12-17

## 2024-12-18 ENCOUNTER — APPOINTMENT (OUTPATIENT)
Dept: OPHTHALMOLOGY | Facility: CLINIC | Age: 84
End: 2024-12-18
Payer: MEDICARE

## 2024-12-18 DIAGNOSIS — H47.393 OPTIC NERVE CUPPING OF BOTH EYES: ICD-10-CM

## 2024-12-18 DIAGNOSIS — H25.811 COMBINED FORMS OF AGE-RELATED CATARACT, RIGHT EYE: ICD-10-CM

## 2024-12-18 DIAGNOSIS — H40.1132 PRIMARY OPEN ANGLE GLAUCOMA (POAG) OF BOTH EYES, MODERATE STAGE: Primary | ICD-10-CM

## 2024-12-18 DIAGNOSIS — H26.492 POSTERIOR CAPSULAR OPACIFICATION OF LEFT EYE, OBSCURING VISION: ICD-10-CM

## 2024-12-18 DIAGNOSIS — E11.65 TYPE 2 DIABETES MELLITUS WITH HYPERGLYCEMIA, WITHOUT LONG-TERM CURRENT USE OF INSULIN: ICD-10-CM

## 2024-12-18 DIAGNOSIS — Z96.1 PSEUDOPHAKIA: ICD-10-CM

## 2024-12-18 DIAGNOSIS — H17.9 CORNEAL SCAR, LEFT EYE: ICD-10-CM

## 2024-12-18 PROCEDURE — 92012 INTRM OPH EXAM EST PATIENT: CPT | Performed by: OPHTHALMOLOGY

## 2024-12-18 RX ORDER — TIMOLOL MALEATE 5 MG/ML
1 SOLUTION/ DROPS OPHTHALMIC 2 TIMES DAILY
Qty: 15 ML | Refills: 3 | Status: SHIPPED | OUTPATIENT
Start: 2024-12-18

## 2024-12-18 RX ORDER — LATANOPROST 50 UG/ML
1 SOLUTION/ DROPS OPHTHALMIC NIGHTLY
Qty: 2.5 ML | Refills: 3 | Status: SHIPPED | OUTPATIENT
Start: 2024-12-18 | End: 2025-01-17

## 2024-12-18 ASSESSMENT — VISUAL ACUITY
METHOD: SNELLEN - LINEAR
OS_SC: 20/100
OD_SC: 20/25-2

## 2024-12-18 ASSESSMENT — TONOMETRY
OS_IOP_MMHG: 13
IOP_METHOD: GOLDMANN APPLANATION
OD_IOP_MMHG: 12

## 2024-12-18 ASSESSMENT — EXTERNAL EXAM - RIGHT EYE: OD_EXAM: NORMAL

## 2024-12-18 ASSESSMENT — SLIT LAMP EXAM - LIDS
COMMENTS: NORMAL
COMMENTS: NORMAL

## 2024-12-18 ASSESSMENT — EXTERNAL EXAM - LEFT EYE: OS_EXAM: NORMAL

## 2024-12-18 ASSESSMENT — ENCOUNTER SYMPTOMS: EYES NEGATIVE: 1

## 2024-12-18 NOTE — PROGRESS NOTES
Assessment/Plan   Diagnoses and all orders for this visit:  Primary open angle glaucoma (POAG) of both eyes, moderate stage  -     timolol (Timoptic) 0.5 % ophthalmic solution; Administer 1 drop into both eyes 2 times a day.  -     latanoprost (Xalatan) 0.005 % ophthalmic solution; Administer 1 drop into both eyes once daily at bedtime.  -stress compliance with eyedrops    Refer to Glaucoma Service for evaluation and management    Combined forms of age-related cataract, right eye  Pseudophakia  Optic nerve cupping of both eyes  Corneal scar, left eye  Posterior capsular opacification of left eye, obscuring vision

## 2024-12-26 DIAGNOSIS — R29.898 BILATERAL LEG WEAKNESS: Primary | ICD-10-CM

## 2025-01-08 ENCOUNTER — OFFICE VISIT (OUTPATIENT)
Dept: GERIATRIC MEDICINE | Facility: CLINIC | Age: 85
End: 2025-01-08
Payer: MEDICARE

## 2025-01-08 VITALS
TEMPERATURE: 97.1 F | HEART RATE: 68 BPM | DIASTOLIC BLOOD PRESSURE: 64 MMHG | RESPIRATION RATE: 16 BRPM | SYSTOLIC BLOOD PRESSURE: 120 MMHG

## 2025-01-08 DIAGNOSIS — F02.80 LATE ONSET ALZHEIMER'S DEMENTIA WITHOUT BEHAVIORAL DISTURBANCE, PSYCHOTIC DISTURBANCE, MOOD DISTURBANCE, OR ANXIETY, UNSPECIFIED DEMENTIA SEVERITY (MULTI): Primary | ICD-10-CM

## 2025-01-08 DIAGNOSIS — G30.1 LATE ONSET ALZHEIMER'S DEMENTIA WITHOUT BEHAVIORAL DISTURBANCE, PSYCHOTIC DISTURBANCE, MOOD DISTURBANCE, OR ANXIETY, UNSPECIFIED DEMENTIA SEVERITY (MULTI): Primary | ICD-10-CM

## 2025-01-08 DIAGNOSIS — M17.0 PRIMARY OSTEOARTHRITIS OF BOTH KNEES: ICD-10-CM

## 2025-01-08 DIAGNOSIS — E11.65 TYPE 2 DIABETES MELLITUS WITH HYPERGLYCEMIA, WITHOUT LONG-TERM CURRENT USE OF INSULIN: ICD-10-CM

## 2025-01-08 DIAGNOSIS — I48.20 CHRONIC ATRIAL FIBRILLATION (MULTI): ICD-10-CM

## 2025-01-08 DIAGNOSIS — R53.81 PHYSICAL DEBILITY: ICD-10-CM

## 2025-01-08 DIAGNOSIS — I73.9 PVD (PERIPHERAL VASCULAR DISEASE) (CMS-HCC): ICD-10-CM

## 2025-01-08 PROCEDURE — 99348 HOME/RES VST EST LOW MDM 30: CPT | Performed by: CLINICAL NURSE SPECIALIST

## 2025-01-08 PROCEDURE — 3074F SYST BP LT 130 MM HG: CPT | Performed by: CLINICAL NURSE SPECIALIST

## 2025-01-08 PROCEDURE — 1157F ADVNC CARE PLAN IN RCRD: CPT | Performed by: CLINICAL NURSE SPECIALIST

## 2025-01-08 PROCEDURE — 3078F DIAST BP <80 MM HG: CPT | Performed by: CLINICAL NURSE SPECIALIST

## 2025-01-08 NOTE — PROGRESS NOTES
"Reason for visit:  follow up home visit for DM2 and general debility    Reason for homebound status: Betito is homebound due to the fact that his is weak and has advancing dementia and has much difficulty getting in and out of a car, follow instructions, and it is taxing on his caregiver    HPI: Betito was seen today in his apartment with his SO Roz present. It was obvious that Roz is much less stressed now. She tells me that she has a STNA now coming for a couple hours 5 days a week. Since that started, Betito seems to be capable of walking to the bathroom and has had less incontinence.     Chief Complaint: \"I feel fine\"    Review of Systems   General: feels fine today, no fever or chills, sleeps all night in bed, but Roz says he is up often, watches TV but does not disturb her; he feels rested during day but does doze at times; sedentary, staying in his room; appetite is good  Skin: no lesions, bruising, pruritus; no skin irritation from incontinence  Eyes:  adequate near and distant vision with no recent vision changes but Roz says he is going to have cataract surgery soon; no eye pain or drainage or irritation; no ear drainage or hearing loss; no sinus drainage or congestion;  no oral pain or sore throat; no lesions or sores; aide brushes his teeth daily now; no trouble swallowing  Neck: no pain; no decreased ROM  Respiratory: no cough, congestion or shortness of breath  Cardiac: no chest pain or palpitations  Gastrointestinal: no abdominal pain, no nausea, vomiting, heartburn, diarrhea or constipation; BM few times every day but often incontinent  Urinary: making it to the bathroom often during the day, mostly incontinent at night; no discomfort with urination;; normal color of urine  Musculoskeletal: denies pain or changes in strength; walks independently with walker but admits he is generally weak and sedentary; walks more with aide here  Neurologic: decreased long and short term memory per SO; no headaches, " dizziness or lightheadedness; no tremors or involuntary movements; no altered sensation; no unilateral weakness  Psychiatric: no depression or anxiety; mood is stable; not interested in doing anything but he is ok with this; SO says he is not behavioral    Physical Examination   Vitals: Blood pressure 120/64, pulse 68, temperature 36.2 °C (97.1 °F), resp. rate 16.  Pulse ox 96%  Unable to get wt due to having carpet in apartment  General: lying across his bed sideways, well nourished, appears comfortable and content; has trouble communicating due to expressive aphasia, answering some appropriately; SO stayed with us this time and answered questions; sat up on side of bed but could not sit upright  Neurologic: alert, oriented x2 minimal insight; very little if any short term memory; speech clear but brief; facial features symmetrical; tongue midline; no involuntary movements; moderate strength and equal peripheral strength, tone and sensation  Skin: no abnormalities of hair; has rough darkened toenails; no lesions, discolorations, or excessive dryness other than some dark dry flakes of skin on feet distally  HEENT: normocephalic, symmetrical features, no evidence of trauma, no tenderness; no ptosis, conjunctival inflammation or scleral icterus; vision seems adequate for ADLs; hearing as well adequate at close distance; no ear drainage or irritation; no nasal drainage or obvious congestion; Lips pink, mucosa pink and moist w/o coating or lesions, no malodor of breath; teeth appear in average condition  Respiratory: unlabored; chest symmetrical w/ good rise and fall; regular rhythm and depth, breath sounds equal and clear throughout  Heart: regular rhythm, rate controlled; slight murmur; no rubs and gallops  Abdomen: nondistended, nontenderness, bowel sounds active x4, no masses  Extremities: no deformities, tenderness,; weakly palpable peripheral pulses, no cyanosis or clubbing; 1+ edema of feet only; normal  temperature, no varicose veins  Musculoskeletal: no limited joint mobility with decent ROM of extremities, no tenderness, effusion, erythema or deformity, moderate thoracic kyphosis but no scoliosis  Psych: calm, attempts to communicate, cooperative; behaviors appropriate; flat affect generally    BS readings at home: today 156, one 88 lately but rest between 110-130    Diagnoses and Plan:   1. Mild late onset Alzheimer's dementia without behavioral disturbance, psychotic disturbance, mood disturbance, or anxiety (Multi)  To me seems to be declining; wife is fine with no treatment for it as she does not want to prolong his life and he is content with no behaviors    2. Physical debility  Did not start PT but stna is helping and making him walk more and Roz feels he is either stronger or more motivated    3. BPH with incontinence - tamsulosin bid helping considerably    4. Type 2 diabetes mellitus with hyperglycemia, without long-term current use of insulin (Primary)  - metformin, Jardiance but Trulicity not started; not sure what final decision was from clinical pharmacist but BS good at this point and may be best to keep regimen simple for SO    3. Atrial fibrillation, unspecified type (Multi)  Regular today; diltiazem but no AC    4. Benign essential hypertension  Diltiazem only; BP wnl    5. Bowel incontinence - improved with bid tamsulosin; has home help as well now    6. PVD (peripheral vascular disease) (CMS-HCC) and Dyslipidemia  Did not get lipid panel with recent labs; ok to continue atorvastatin and will check lipid panel with next labs  No acute concerns    7. Chronic renal impairment, stage 3b (Multi)  Stable; gfr 40    8. Primary osteoarthritis of both knees  Makes it so he cannot stand upright and walks slow; uses walker; no recent falls; denies need for pain meds

## 2025-01-17 PROCEDURE — G0179 MD RECERTIFICATION HHA PT: HCPCS | Performed by: CLINICAL NURSE SPECIALIST

## 2025-01-20 DIAGNOSIS — E11.29 CONTROLLED TYPE 2 DIABETES MELLITUS WITH OTHER DIABETIC KIDNEY COMPLICATION, WITHOUT LONG-TERM CURRENT USE OF INSULIN: ICD-10-CM

## 2025-01-20 DIAGNOSIS — N40.0 BENIGN PROSTATIC HYPERPLASIA WITHOUT LOWER URINARY TRACT SYMPTOMS: ICD-10-CM

## 2025-01-20 RX ORDER — SITAGLIPTIN 50 MG/1
50 TABLET, FILM COATED ORAL DAILY
Qty: 30 TABLET | Refills: 1 | OUTPATIENT
Start: 2025-01-20

## 2025-01-20 RX ORDER — TAMSULOSIN HYDROCHLORIDE 0.4 MG/1
0.8 CAPSULE ORAL DAILY
Qty: 60 CAPSULE | Refills: 1 | OUTPATIENT
Start: 2025-01-20

## 2025-01-20 RX ORDER — TAMSULOSIN HYDROCHLORIDE 0.4 MG/1
0.4 CAPSULE ORAL 2 TIMES DAILY
Qty: 180 CAPSULE | Refills: 3 | Status: SHIPPED | OUTPATIENT
Start: 2025-01-20 | End: 2026-01-20

## 2025-02-12 ENCOUNTER — TELEPHONE (OUTPATIENT)
Dept: GERIATRIC MEDICINE | Facility: CLINIC | Age: 85
End: 2025-02-12
Payer: MEDICARE

## 2025-02-17 ENCOUNTER — APPOINTMENT (OUTPATIENT)
Dept: OPHTHALMOLOGY | Facility: CLINIC | Age: 85
End: 2025-02-17
Payer: MEDICARE

## 2025-02-17 DIAGNOSIS — H40.1132 PRIMARY OPEN ANGLE GLAUCOMA (POAG) OF BOTH EYES, MODERATE STAGE: Primary | ICD-10-CM

## 2025-02-17 PROCEDURE — 99214 OFFICE O/P EST MOD 30 MIN: CPT | Performed by: OPHTHALMOLOGY

## 2025-02-17 PROCEDURE — 92133 CPTRZD OPH DX IMG PST SGM ON: CPT | Performed by: OPHTHALMOLOGY

## 2025-02-17 ASSESSMENT — ENCOUNTER SYMPTOMS
CARDIOVASCULAR NEGATIVE: 0
NEUROLOGICAL NEGATIVE: 0
GASTROINTESTINAL NEGATIVE: 0
MUSCULOSKELETAL NEGATIVE: 0
EYES NEGATIVE: 0
PSYCHIATRIC NEGATIVE: 0
ENDOCRINE NEGATIVE: 0
CONSTITUTIONAL NEGATIVE: 0
RESPIRATORY NEGATIVE: 0
ALLERGIC/IMMUNOLOGIC NEGATIVE: 0
HEMATOLOGIC/LYMPHATIC NEGATIVE: 0

## 2025-02-17 ASSESSMENT — SLIT LAMP EXAM - LIDS
COMMENTS: NORMAL
COMMENTS: NORMAL

## 2025-02-17 ASSESSMENT — VISUAL ACUITY
OS_SC: 20/400
METHOD: SNELLEN - LINEAR
OD_PH_SC+: -2
OD_PH_SC: 20/25
OD_SC: 20/40

## 2025-02-17 ASSESSMENT — TONOMETRY
OD_IOP_MMHG: 17
OS_IOP_MMHG: 17
IOP_METHOD: TONOPEN

## 2025-02-17 ASSESSMENT — PACHYMETRY
OS_CT(UM): 557
OD_CT(UM): 502

## 2025-02-17 ASSESSMENT — EXTERNAL EXAM - RIGHT EYE: OD_EXAM: NORMAL

## 2025-02-17 ASSESSMENT — EXTERNAL EXAM - LEFT EYE: OS_EXAM: NORMAL

## 2025-02-17 NOTE — PROGRESS NOTES
1-oag on timolol, due to dementia and age, stop and staert rocklatan ou every day hs  2-ns ou  3-ritu tears ou    Rto 1mo iop check

## 2025-02-18 NOTE — TELEPHONE ENCOUNTER
Roz states she is concerned that patient has foot swelling to dorsal aspect of R foot. Couple of days of swelling, denies pain, no redness. Message forwarded to MARIO Shirley for review on 2/12/25.

## 2025-02-20 ENCOUNTER — APPOINTMENT (OUTPATIENT)
Dept: OPHTHALMOLOGY | Facility: CLINIC | Age: 85
End: 2025-02-20
Payer: MEDICARE

## 2025-03-05 ENCOUNTER — OFFICE VISIT (OUTPATIENT)
Dept: GERIATRIC MEDICINE | Facility: CLINIC | Age: 85
End: 2025-03-05
Payer: MEDICARE

## 2025-03-05 VITALS
DIASTOLIC BLOOD PRESSURE: 70 MMHG | TEMPERATURE: 96.9 F | SYSTOLIC BLOOD PRESSURE: 128 MMHG | RESPIRATION RATE: 16 BRPM | HEART RATE: 84 BPM

## 2025-03-05 DIAGNOSIS — R29.898 BILATERAL LEG WEAKNESS: ICD-10-CM

## 2025-03-05 DIAGNOSIS — F02.B0: Primary | ICD-10-CM

## 2025-03-05 DIAGNOSIS — N40.0 ENLARGED PROSTATE: ICD-10-CM

## 2025-03-05 DIAGNOSIS — G30.1: Primary | ICD-10-CM

## 2025-03-05 DIAGNOSIS — I10 BENIGN ESSENTIAL HYPERTENSION: ICD-10-CM

## 2025-03-05 DIAGNOSIS — M17.0 PRIMARY OSTEOARTHRITIS OF BOTH KNEES: ICD-10-CM

## 2025-03-05 DIAGNOSIS — E11.65 TYPE 2 DIABETES MELLITUS WITH HYPERGLYCEMIA, WITHOUT LONG-TERM CURRENT USE OF INSULIN: ICD-10-CM

## 2025-03-05 DIAGNOSIS — I48.0 PAROXYSMAL ATRIAL FIBRILLATION (MULTI): ICD-10-CM

## 2025-03-05 PROBLEM — R26.2 DIFFICULTY WALKING: Status: RESOLVED | Noted: 2023-03-18 | Resolved: 2025-03-05

## 2025-03-05 PROBLEM — F02.80 ALZHEIMER'S DEMENTIA (MULTI): Status: RESOLVED | Noted: 2023-10-10 | Resolved: 2025-03-05

## 2025-03-05 PROBLEM — R60.0 BILATERAL LOWER EXTREMITY EDEMA: Status: RESOLVED | Noted: 2023-03-18 | Resolved: 2025-03-05

## 2025-03-05 PROBLEM — G30.9 ALZHEIMER'S DEMENTIA (MULTI): Status: RESOLVED | Noted: 2023-10-10 | Resolved: 2025-03-05

## 2025-03-05 PROBLEM — R41.3 MEMORY LOSS: Status: RESOLVED | Noted: 2023-03-18 | Resolved: 2025-03-05

## 2025-03-05 PROBLEM — N18.9 CHRONIC RENAL INSUFFICIENCY: Status: RESOLVED | Noted: 2023-03-18 | Resolved: 2025-03-05

## 2025-03-05 PROBLEM — R26.9 GAIT DISORDER: Status: RESOLVED | Noted: 2023-03-18 | Resolved: 2025-03-05

## 2025-03-05 PROBLEM — M17.9 OSTEOARTHRITIS OF KNEE: Status: RESOLVED | Noted: 2023-03-18 | Resolved: 2025-03-05

## 2025-03-05 PROBLEM — R25.1 TREMOR: Status: RESOLVED | Noted: 2023-03-18 | Resolved: 2025-03-05

## 2025-03-05 PROCEDURE — 1036F TOBACCO NON-USER: CPT | Performed by: CLINICAL NURSE SPECIALIST

## 2025-03-05 PROCEDURE — 1157F ADVNC CARE PLAN IN RCRD: CPT | Performed by: CLINICAL NURSE SPECIALIST

## 2025-03-05 PROCEDURE — 3078F DIAST BP <80 MM HG: CPT | Performed by: CLINICAL NURSE SPECIALIST

## 2025-03-05 PROCEDURE — 1159F MED LIST DOCD IN RCRD: CPT | Performed by: CLINICAL NURSE SPECIALIST

## 2025-03-05 PROCEDURE — G2211 COMPLEX E/M VISIT ADD ON: HCPCS | Performed by: CLINICAL NURSE SPECIALIST

## 2025-03-05 PROCEDURE — 3074F SYST BP LT 130 MM HG: CPT | Performed by: CLINICAL NURSE SPECIALIST

## 2025-03-05 PROCEDURE — 99348 HOME/RES VST EST LOW MDM 30: CPT | Performed by: CLINICAL NURSE SPECIALIST

## 2025-03-05 NOTE — PROGRESS NOTES
"Reason for visit:  follow up home visit    Reason for homebound status: Betito is homebound due to the fact that his is weak and has advancing dementia and has much difficulty getting in and out of a car, follow instructions, and it is taxing on his caregiver    HPI: Betito was seen today in his apartment with his SO Roz present. Betito went to the ophthalmologist yesterday and had eye drops changed but otherwise no other medical issues or appointments over the past few months.      Chief Complaint: \"I feel fine\"    Review of Systems   General: feels fine today, no fever or chills, sleeps all night in bed; he feels rested during day but does doze at times; sedentary, staying in his room; appetite is good; not walking to bathroom anymore, aides come to change him 3 times a day and wash him up; wife says it helps a lot  Skin: no lesions, bruising, pruritus; no skin irritation from incontinence  Eyes:  adequate near and distant vision with no recent vision changes but Roz says he is going to have cataract surgery soon; no eye pain or drainage or irritation; no ear drainage or hearing loss; no sinus drainage or congestion;  no oral pain or sore throat; no lesions or sores; aide brushes his teeth daily now; no trouble swallowing  Neck: no pain; no decreased ROM  Respiratory: no cough, congestion or shortness of breath  Cardiac: no chest pain or palpitations  Gastrointestinal: no abdominal pain, no nausea, vomiting, heartburn, diarrhea or constipation; BM once a day and incontinent  Urinary: incontinent at all times now; no discomfort with urination;; normal color of urine  Musculoskeletal: denies pain or changes in strength; wife says he seems to have lost desire to walk  Neurologic: decreased long and short term memory per SO; no headaches, dizziness or lightheadedness; no tremors or involuntary movements; no altered sensation; no unilateral weakness  Psychiatric: no depression or anxiety; mood is stable; not interested in " doing anything but he is ok with this; SO says he is not behavioral    Physical Examination   Vitals: Blood pressure 128/70, pulse 84, temperature 36.1 °C (96.9 °F), resp. rate 16.  Pulse ox 97%  Unable to get wt due to inability to stand without holding onto walker  General: sitting in chair in bedroom; well nourished, appears comfortable and content; has trouble communicating due to expressive aphasia, answering some appropriately, and simple answers; denies any complaints SO had to answer some questions  Neurologic: alert, oriented x2 minimal insight; very little short term memory; speech clear but brief; facial features symmetrical; tongue midline; no involuntary movements; moderate strength and equal peripheral strength, tone and sensation  Skin: no abnormalities of hair; has rough darkened toenails; no lesions, discolorations, or excessive dryness   HEENT: normocephalic, symmetrical features, no evidence of trauma, no tenderness; no ptosis, conjunctival inflammation or scleral icterus; vision seems adequate for ADLs; hearing as well adequate at close distance; no ear drainage or irritation; no nasal drainage or obvious congestion; Lips pink, mucosa pink and moist w/o coating or lesions, no malodor of breath; teeth appear in average condition  Respiratory: unlabored; chest symmetrical w/ good rise and fall; regular rhythm and depth, breath sounds equal and clear throughout  Heart: regular rhythm, rate controlled; slight murmur; no rubs and gallops  Abdomen: nondistended, nontenderness, bowel sounds active x4, no masses  Extremities: no deformities, tenderness,; weakly palpable peripheral pulses, no cyanosis or clubbing; trace edema of feet only; normal temperature, no varicose veins  Musculoskeletal: no limited joint mobility with decent ROM of extremities, no tenderness, effusion, erythema or deformity, moderate thoracic kyphosis but no scoliosis  Psych: calm, attempts to communicate, cooperative; behaviors  appropriate; flat affect generally    BS readings at home between 110-130    Diagnoses and Plan:   1. Moderate late onset Alzheimer's dementia without behavioral disturbance, psychotic disturbance, mood disturbance, or anxiety (Multi)  seems to be declining; wife is fine with no treatment for it as she does not want to prolong his life and he is content with no behaviors; if continues to decline would qualify for hospice; check back next visit    2. Physical debility  No longer walking, just standing and transferring; can order PT if SO interested but currently not interested    3. BPH with incontinence - tamsulosin bid helping considerably    4. Type 2 diabetes mellitus with hyperglycemia, without long-term current use of insulin (Primary)  - metformin, Jardiance; BS good at this point     3. Atrial fibrillation, unspecified type (Multi)  Regular today; diltiazem but no AC    4. Benign essential hypertension  Diltiazem only; BP wnl    5. Bowel incontinence - improved with bid tamsulosin initially but now fully incontinent    6. PVD (peripheral vascular disease) (CMS-HCC) and Dyslipidemia  Did not get lipid panel with recent labs; ok to continue atorvastatin and will check lipid panel with next labs  No acute concerns    7. Chronic renal impairment, stage 3b (Multi)  Stable; gfr 40    8. Primary osteoarthritis of both knees  Makes it so he cannot stand upright; no recent falls; denies pain, just some stiffness   No

## 2025-03-06 NOTE — PATIENT INSTRUCTIONS
Next appointment early June; jackelin to call for exact date and time  Call earlier for any needs! Hope to see you at your new place then!

## 2025-03-18 DIAGNOSIS — R60.0 BILATERAL LOWER EXTREMITY EDEMA: ICD-10-CM

## 2025-03-18 DIAGNOSIS — I10 BENIGN ESSENTIAL HYPERTENSION: ICD-10-CM

## 2025-03-18 DIAGNOSIS — E78.2 HYPERLIPEMIA, MIXED: ICD-10-CM

## 2025-03-18 DIAGNOSIS — E87.6 HYPOKALEMIA: ICD-10-CM

## 2025-03-19 RX ORDER — ATORVASTATIN CALCIUM 40 MG/1
40 TABLET, FILM COATED ORAL NIGHTLY
Qty: 30 TABLET | Refills: 1 | Status: SHIPPED | OUTPATIENT
Start: 2025-03-19

## 2025-03-19 RX ORDER — POTASSIUM CHLORIDE 750 MG/1
10 TABLET, EXTENDED RELEASE ORAL 2 TIMES DAILY
Qty: 60 TABLET | Refills: 1 | Status: SHIPPED | OUTPATIENT
Start: 2025-03-19

## 2025-03-19 RX ORDER — CHLORTHALIDONE 25 MG/1
25 TABLET ORAL
Qty: 90 TABLET | Refills: 3 | OUTPATIENT
Start: 2025-03-19

## 2025-03-19 RX ORDER — CHLORTHALIDONE 25 MG/1
25 TABLET ORAL
Qty: 30 TABLET | Refills: 1 | Status: SHIPPED | OUTPATIENT
Start: 2025-03-19

## 2025-03-19 RX ORDER — ATORVASTATIN CALCIUM 40 MG/1
40 TABLET, FILM COATED ORAL NIGHTLY
Qty: 90 TABLET | Refills: 3 | OUTPATIENT
Start: 2025-03-19

## 2025-03-19 RX ORDER — POTASSIUM CHLORIDE 750 MG/1
10 TABLET, FILM COATED, EXTENDED RELEASE ORAL 2 TIMES DAILY
Qty: 90 TABLET | Refills: 3 | OUTPATIENT
Start: 2025-03-19

## 2025-03-24 ENCOUNTER — APPOINTMENT (OUTPATIENT)
Dept: OPHTHALMOLOGY | Facility: CLINIC | Age: 85
End: 2025-03-24
Payer: MEDICARE

## 2025-03-24 DIAGNOSIS — H40.10X3 OPEN-ANGLE GLAUCOMA OF BOTH EYES, SEVERE STAGE, UNSPECIFIED OPEN-ANGLE GLAUCOMA TYPE: Primary | ICD-10-CM

## 2025-03-24 PROCEDURE — 99213 OFFICE O/P EST LOW 20 MIN: CPT | Performed by: OPHTHALMOLOGY

## 2025-03-24 ASSESSMENT — EXTERNAL EXAM - RIGHT EYE: OD_EXAM: NORMAL

## 2025-03-24 ASSESSMENT — PACHYMETRY
OS_CT(UM): 557
OD_CT(UM): 502

## 2025-03-24 ASSESSMENT — TONOMETRY
OS_IOP_MMHG: 14
IOP_METHOD: TONOPEN APPLANATION
OD_IOP_MMHG: 12

## 2025-03-24 ASSESSMENT — SLIT LAMP EXAM - LIDS
COMMENTS: NORMAL
COMMENTS: NORMAL

## 2025-03-24 ASSESSMENT — EXTERNAL EXAM - LEFT EYE: OS_EXAM: NORMAL

## 2025-03-24 ASSESSMENT — VISUAL ACUITY: METHOD: SNELLEN - LINEAR

## 2025-04-11 ENCOUNTER — TELEPHONE (OUTPATIENT)
Dept: GERIATRIC MEDICINE | Facility: CLINIC | Age: 85
End: 2025-04-11
Payer: MEDICARE

## 2025-05-02 DIAGNOSIS — R60.0 BILATERAL LOWER EXTREMITY EDEMA: ICD-10-CM

## 2025-05-02 DIAGNOSIS — I10 BENIGN ESSENTIAL HYPERTENSION: ICD-10-CM

## 2025-05-02 DIAGNOSIS — E78.2 HYPERLIPEMIA, MIXED: ICD-10-CM

## 2025-05-02 DIAGNOSIS — E87.6 HYPOKALEMIA: ICD-10-CM

## 2025-05-05 RX ORDER — POTASSIUM CHLORIDE 750 MG/1
10 TABLET, EXTENDED RELEASE ORAL 2 TIMES DAILY
Qty: 60 TABLET | Refills: 2 | Status: SHIPPED | OUTPATIENT
Start: 2025-05-05

## 2025-05-05 RX ORDER — CHLORTHALIDONE 25 MG/1
25 TABLET ORAL
Qty: 30 TABLET | Refills: 2 | Status: SHIPPED | OUTPATIENT
Start: 2025-05-05

## 2025-05-05 RX ORDER — ATORVASTATIN CALCIUM 40 MG/1
40 TABLET, FILM COATED ORAL NIGHTLY
Qty: 30 TABLET | Refills: 2 | Status: SHIPPED | OUTPATIENT
Start: 2025-05-05

## 2025-05-28 DIAGNOSIS — H40.1132 PRIMARY OPEN ANGLE GLAUCOMA (POAG) OF BOTH EYES, MODERATE STAGE: ICD-10-CM

## 2025-05-28 RX ORDER — NETARSUDIL AND LATANOPROST OPHTHALMIC SOLUTION, 0.02%/0.005% .2; .05 MG/ML; MG/ML
SOLUTION/ DROPS OPHTHALMIC; TOPICAL
Qty: 2.5 ML | Refills: 3 | Status: SHIPPED | OUTPATIENT
Start: 2025-05-28

## 2025-06-16 ENCOUNTER — TELEPHONE (OUTPATIENT)
Dept: GERIATRIC MEDICINE | Facility: CLINIC | Age: 85
End: 2025-06-16
Payer: MEDICARE

## 2025-06-16 NOTE — TELEPHONE ENCOUNTER
Call received from Roz Jimenez stating that patient has only been eating fruit for the past three weeks. Intake of fluid is adequate and no other changes in behavior have been of note. Has occasionally eaten other things but diet now consists of mainly fruit. Message forwarded to MARIO Null.

## 2025-06-18 ENCOUNTER — OFFICE VISIT (OUTPATIENT)
Dept: GERIATRIC MEDICINE | Facility: CLINIC | Age: 85
End: 2025-06-18
Payer: MEDICARE

## 2025-06-18 ENCOUNTER — APPOINTMENT (OUTPATIENT)
Dept: OPHTHALMOLOGY | Facility: CLINIC | Age: 85
End: 2025-06-18
Payer: MEDICARE

## 2025-06-18 DIAGNOSIS — F02.B0: Primary | ICD-10-CM

## 2025-06-18 DIAGNOSIS — M17.0 PRIMARY OSTEOARTHRITIS OF BOTH KNEES: ICD-10-CM

## 2025-06-18 DIAGNOSIS — G30.1: Primary | ICD-10-CM

## 2025-06-18 DIAGNOSIS — I10 BENIGN ESSENTIAL HYPERTENSION: ICD-10-CM

## 2025-06-18 DIAGNOSIS — E11.65 TYPE 2 DIABETES MELLITUS WITH HYPERGLYCEMIA, WITHOUT LONG-TERM CURRENT USE OF INSULIN: ICD-10-CM

## 2025-06-18 DIAGNOSIS — I48.20 CHRONIC ATRIAL FIBRILLATION (MULTI): ICD-10-CM

## 2025-06-18 DIAGNOSIS — R53.81 PHYSICAL DEBILITY: ICD-10-CM

## 2025-06-18 PROCEDURE — 3074F SYST BP LT 130 MM HG: CPT | Performed by: CLINICAL NURSE SPECIALIST

## 2025-06-18 PROCEDURE — 3078F DIAST BP <80 MM HG: CPT | Performed by: CLINICAL NURSE SPECIALIST

## 2025-06-18 PROCEDURE — 99348 HOME/RES VST EST LOW MDM 30: CPT | Performed by: CLINICAL NURSE SPECIALIST

## 2025-06-18 PROCEDURE — 1159F MED LIST DOCD IN RCRD: CPT | Performed by: CLINICAL NURSE SPECIALIST

## 2025-06-18 RX ORDER — METFORMIN HYDROCHLORIDE 500 MG/1
500 TABLET ORAL
Qty: 200 TABLET | Refills: 3 | Status: SHIPPED | OUTPATIENT
Start: 2025-06-18 | End: 2026-07-23

## 2025-06-18 RX ORDER — DILTIAZEM HYDROCHLORIDE 180 MG/1
180 CAPSULE, COATED, EXTENDED RELEASE ORAL DAILY
Qty: 90 CAPSULE | Refills: 3 | Status: SHIPPED | OUTPATIENT
Start: 2025-06-18 | End: 2026-06-18

## 2025-06-19 VITALS
HEART RATE: 104 BPM | SYSTOLIC BLOOD PRESSURE: 100 MMHG | RESPIRATION RATE: 16 BRPM | TEMPERATURE: 96.8 F | DIASTOLIC BLOOD PRESSURE: 50 MMHG

## 2025-06-19 NOTE — PATIENT INSTRUCTIONS
Decrease metformin to 500mg twice a day  Check blood sugars every am and report if less than 100 consistently  Decrease diltiazem to 120mg daily  May qualify for hospice at any time due to some decline; let Nella know if hospice is desired  Will follow up in 6 weeks once you are moved and settled; call sooner if needed

## 2025-06-19 NOTE — PROGRESS NOTES
"Reason for visit:  follow up home visit    Reason for homebound status: Betito is homebound due to the fact that his is weak and has advancing dementia and has much difficulty getting in and out of a car, follow instructions, and it is taxing on his caregiver    HPI: Betito was seen today in his apartment with his SO Roz present. Roz called a few days ago to report that Betito suddenly is only eating fruit and seems his appetite has decreased.  Roz reports she has been packing up for the past week or more due to buying a house and moving in a few days    Chief Complaint: \"I feel ok\"; wife reports he was only eating frust for a few days but this am ate 2 waffles and applesauce.     Review of Systems   General: hasn't taken medications since yesterday; feels fine today, no fever or chills, sleeps all night in bed; he feels tired during day and wife says he naps often, staying in his bed; appetite is good; denies any changes in his health overall  Skin: no lesions, bruising, pruritus; no skin irritation from incontinence  Eyes:  adequate near and distant vision with no recent vision changes but planning cataract surgery still; no eye pain or drainage or irritation; no ear drainage or hearing loss; no sinus drainage or congestion;  no oral pain or sore throat; no lesions or sores; aide brushes his teeth daily; no trouble swallowing and eats soft foods  Neck: no pain; no decreased ROM  Respiratory: no cough, congestion or shortness of breath  Cardiac: no chest pain or palpitations  Gastrointestinal: no abdominal pain, no nausea, vomiting, heartburn, diarrhea or constipation; BM few times  a day and incontinent  Urinary: incontinent all the time now; no discomfort with urination;; normal color of urine  Musculoskeletal: denies pain or changes in strength; wife says he stays in bed   Neurologic: decreased long  term memory and no short term memory per SO; no headaches, dizziness or lightheadedness; no tremors or involuntary " movements; no altered sensation; no unilateral weakness  Psychiatric: no depression or anxiety; mood is stable; not interested in doing anything but he is ok with this; SO says he is not behavioral    Physical Examination   BS readings: today 84, often  in am, sometimes 120, only checking periodically, not daily  Vitals: Blood pressure 100/50, pulse 104, temperature 36 °C (96.8 °F), resp. rate 16.  Pulse ox 97%  Unable to get wt due to inability to stand without holding onto walker  General: was awake in bed, appears comfortable and content; has trouble communicating due to expressive aphasia, answering some appropriately, and simple answers; He appears he may have lost some wt  Neurologic: alert, oriented x2 minimal insight but says he is aware they are moving; very little short term memory; speech clear but brief; facial features symmetrical; tongue midline; no involuntary movements; moderate strength and equal peripheral strength, tone and sensation  Skin: no abnormalities of hair; has rough darkened toenails; no lesions, discolorations, or excessive dryness   HEENT: no evidence of trauma, no ptosis, conjunctival inflammation or scleral icterus; hearing as well adequate at close distance; no ear drainage or irritation; no nasal drainage or obvious congestion;  mucosa pink and moist w/o coating or lesions, no malodor of breath; teeth appear in average condition  Respiratory: unlabored; regular rhythm, does not take much of a deep breath, breath sounds equal and clear throughout  Heart: regular rhythm, rate 104 w/o medication in 24 hr; slight murmur; no rubs and gallops  Abdomen: nondistended, nontenderness, bowel sounds active x4, no masses  Extremities: no deformities, tenderness,; weakly palpable peripheral pulses, no cyanosis or clubbing; no edema; normal temperature  Musculoskeletal: no limited joint mobility with decent ROM of extremities, no tenderness, effusion, erythema or deformity, moderate  thoracic kyphosis; able to sit up in bed when asked  Psych: calm, cooperative; behaviors appropriate; affect seems appropriate    Diagnoses and Plan:   1. Moderate to severe late onset Alzheimer's dementia without behavioral disturbance, psychotic disturbance, mood disturbance, or anxiety (Multi)  Slowly declining; wife is fine with no treatment for it as she does not want to prolong his life and he is content with no behaviors; she is pleased with the help she is getting and that will continue when they move; explained he may have physical or emotional changes after the move so call me if any worries    2. Physical debility  No longer walking, mostly staying in bed; he would not benefit from therapy as he has no motivation to continue activity once therapy would be done and would decline immediately    3. BPH with incontinence - tamsulosin bid help considerably but could stop anytime; will await next visit after he moves    4. Type 2 diabetes mellitus with hyperglycemia, without long-term current use of insulin (Primary)  - metformin, Jardiance;  time to cut back on meds; explained BS less than 100 too low for his age and with dementia, could be causing the increased daytime sleepiness; cut to 500mg bid on metformin, check BS qam after she gets moved, and will check back in 6 weeks  3. Atrial fibrillation, unspecified type (Multi)  Regular today; diltiazem but no AC; rate 100 but BP low; cut to 120mg diltiazem and will be back to check after the move    4. Benign essential hypertension  Diltiazem decreased due to low BP    5. Bowel incontinence -  now fully incontinent    6. PVD (peripheral vascular disease) (CMS-HCC) and Dyslipidemia  May be time to start cutting out some medications; will discuss after the move, may stop the statin as there is minimal if any benefit at this time    7. Chronic renal impairment, stage 3b (Multi)  No recent labs; would not recommend further labs;; gfr 40    8. Primary osteoarthritis  of both knees  denies pain, just some stiffness; less of a problem since he stays in bed primarily

## 2025-07-16 DIAGNOSIS — I10 BENIGN ESSENTIAL HYPERTENSION: ICD-10-CM

## 2025-07-16 DIAGNOSIS — R60.0 BILATERAL LOWER EXTREMITY EDEMA: ICD-10-CM

## 2025-07-16 DIAGNOSIS — E78.2 HYPERLIPEMIA, MIXED: ICD-10-CM

## 2025-07-16 DIAGNOSIS — E87.6 HYPOKALEMIA: ICD-10-CM

## 2025-07-17 RX ORDER — CHLORTHALIDONE 25 MG/1
25 TABLET ORAL
Qty: 30 TABLET | Refills: 2 | Status: SHIPPED | OUTPATIENT
Start: 2025-07-17

## 2025-07-17 RX ORDER — POTASSIUM CHLORIDE 750 MG/1
10 TABLET, EXTENDED RELEASE ORAL 2 TIMES DAILY
Qty: 60 TABLET | Refills: 2 | Status: SHIPPED | OUTPATIENT
Start: 2025-07-17

## 2025-07-17 RX ORDER — ATORVASTATIN CALCIUM 40 MG/1
40 TABLET, FILM COATED ORAL NIGHTLY
Qty: 30 TABLET | Refills: 2 | Status: SHIPPED | OUTPATIENT
Start: 2025-07-17

## 2025-07-28 ENCOUNTER — OFFICE VISIT (OUTPATIENT)
Dept: GERIATRIC MEDICINE | Facility: CLINIC | Age: 85
End: 2025-07-28
Payer: MEDICARE

## 2025-07-28 DIAGNOSIS — F02.C11 SEVERE LATE ONSET ALZHEIMER'S DEMENTIA WITH AGITATION (MULTI): Primary | ICD-10-CM

## 2025-07-28 DIAGNOSIS — I48.20 CHRONIC ATRIAL FIBRILLATION (MULTI): ICD-10-CM

## 2025-07-28 DIAGNOSIS — Z66 DNR (DO NOT RESUSCITATE): ICD-10-CM

## 2025-07-28 DIAGNOSIS — I10 BENIGN ESSENTIAL HYPERTENSION: ICD-10-CM

## 2025-07-28 DIAGNOSIS — E11.65 TYPE 2 DIABETES MELLITUS WITH HYPERGLYCEMIA, WITHOUT LONG-TERM CURRENT USE OF INSULIN: ICD-10-CM

## 2025-07-28 DIAGNOSIS — G30.1 SEVERE LATE ONSET ALZHEIMER'S DEMENTIA WITH AGITATION (MULTI): Primary | ICD-10-CM

## 2025-07-28 PROCEDURE — 99349 HOME/RES VST EST MOD MDM 40: CPT | Performed by: CLINICAL NURSE SPECIALIST

## 2025-07-28 PROCEDURE — 1036F TOBACCO NON-USER: CPT | Performed by: CLINICAL NURSE SPECIALIST

## 2025-07-28 PROCEDURE — 3074F SYST BP LT 130 MM HG: CPT | Performed by: CLINICAL NURSE SPECIALIST

## 2025-07-28 PROCEDURE — G2211 COMPLEX E/M VISIT ADD ON: HCPCS | Performed by: CLINICAL NURSE SPECIALIST

## 2025-07-28 PROCEDURE — 3078F DIAST BP <80 MM HG: CPT | Performed by: CLINICAL NURSE SPECIALIST

## 2025-07-28 PROCEDURE — 1159F MED LIST DOCD IN RCRD: CPT | Performed by: CLINICAL NURSE SPECIALIST

## 2025-07-29 VITALS
SYSTOLIC BLOOD PRESSURE: 110 MMHG | DIASTOLIC BLOOD PRESSURE: 60 MMHG | HEART RATE: 78 BPM | RESPIRATION RATE: 12 BRPM | TEMPERATURE: 96.9 F

## 2025-07-29 PROBLEM — F02.B0: Status: RESOLVED | Noted: 2025-03-05 | Resolved: 2025-07-29

## 2025-07-29 PROBLEM — F02.C11 SEVERE LATE ONSET ALZHEIMER'S DEMENTIA WITH AGITATION (MULTI): Status: ACTIVE | Noted: 2025-03-05

## 2025-07-29 PROBLEM — G30.1: Status: RESOLVED | Noted: 2025-03-05 | Resolved: 2025-07-29

## 2025-07-29 NOTE — PROGRESS NOTES
"Reason for visit:  physical and mental decline    Reason for homebound status: Betito is homebound due to the fact that his is weak and has advancing dementia and has much difficulty getting in and out of a car, follow instructions, and it is taxing on his caregiver    HPI: Betito was seen today in his new home after moving about 4 weeks ago.  His wife Roz called me about a week ago and told me that he has not been eating much and he has been getting more confused at nighttime and just not acting himself.  He did not have any other symptoms of illness.  She said he was definitely drinking adequately but had not been eating much.  He was still voiding what seemed to be normal amount.  He did not seem to be in any pain.  She said that when she called me last week it was because he was getting agitated at nighttime and messing with the blinds and she was trying to get him to stop doing it and he raised his fists as though he was going to hit her.  She says this was very unusual for him and he had never done anything like that before.  I explained that the move itself could have triggered a decline in his cognition due to the unfamiliar environment.  At that time I told her to call me back if he did not perk up.  So she called me Sunday and let me know he was still not eating much and has been in bed and somewhat lethargic and not interacting much.     Chief Complaint: \"ok\"    Review of Systems   General: hasn't taken medications since yesterday morning; feels fine, doesn't feel sick;  is sleepy  Skin: no skin concerns reported by wife and caregiver  EEENT:  hasn't been coughing with eating or drinking; denies ear pain or sore throat  Respiratory: no cough, congestion or shortness of breath noted by wife or caregiver  Cardiac: no response  Gastrointestinal: no abdominal pain, no nausea; BM a few days ago  Urinary: incontinent all the time; normal color of urine and changes brief 2-3 times a day, once past few days was quite " soaked  Musculoskeletal: denies pain; hasn't been getting out of bed for over a week, maybe 2wk  Neurologic: no headache; wife and caregiver report he seems more confused, not in touch with surroundings, agitated at times but only aggressive that one time last week  Psychiatric: na    Physical Examination   BS readings: 70s to 120  Vitals: Blood pressure 110/60, pulse 78, temperature 36.1 °C (96.9 °F), resp. rate 12.  pulse ox 97%  General: was awake in bed, appears comfortable and content; has trouble communicating due to expressive aphasia but also lethargic; answered few questions with yes/no, wife and caregiver responded to rest; no purposeful movement and only followed command to take deep breath but not other commands   Neurologic: alert, unsure of orientation; minimal yes/no answer, weak speech, slight equal hand grasps but when arms lifted, they just fall when dropped; doesn't stick out tongue or close eyes to command but face is symmetrical; minimal movement of both legs noted  Skin: no lesions, discolorations, or excessive dryness   HEENT: no evidence of trauma, no ptosis, conjunctival inflammation or scleral icterus; hearing seems adequate at close distance; no ear drainage or pain with pressure to ear; no nasal drainage or obvious congestion;  mucosa pink and moist w/o coating or lesions, no malodor of breath  Respiratory: unlabored and shallow; regular rhythm, does not take much of a deep breath, breath sounds equal and clear throughout  Heart: regular rhythm, rate controlled  Abdomen: nondistended, nontenderness, bowel sounds active x4, no masses  Extremities: no deformities, tenderness,; weakly palpable peripheral pulses, hands and feet warm to touch, no cyanosis; no edema; normal temperature  Musculoskeletal: no limited joint mobility with decent PROM of extremities, signs of pain with ROM  Psych: calm    Diagnoses and Plan:   1. Moderate to severe late onset Alzheimer's dementia without behavioral  disturbance, psychotic disturbance, mood disturbance, or anxiety (Multi)  Significant decline since moving to new home; no findings of acute illness; always possibility of a stroke that is not detected with the limited assessment I could do. Explained this to wife. She wants him to keep him at home, is a dnrcc, does not want hospice since she feels she has great paid help at home and he is comfortable. Does not want any investigation into cause of decline and wants him to remain comfortable.    Recommend stopping all meds except eliquis since that would prevent stroke which would be a comfort measure I feel, joyce agrees.    2. Physical debility  Suggested keeping him in bed, keeping him hydrated and not worrying so much about food; provide comfort food; keep head up while eating and drinking and for 1hr after. No skin breakdown but continue to turn every 3hrs and monitor pressure points    3. BPH with incontinence - stop tamsulosin as it was for frequency and not retention    4. Type 2 diabetes mellitus with hyperglycemia, without long-term current use of insulin (Primary)  - stop metformin and Jardiance as he is not eating much and risk of low BS high; no need to continue to check BS    5. Atrial fibrillation, unspecified type (Multi) and HTN  Regular; stop diltiazem since he has not had in over 24 hrs and HR normal; continue eliquis    6. Chronic renal impairment, stage 3b (Multi)  No recent labs; would not recommend further labs nor is wife interested in any labs; gfr 40 last checked; keep hydrated

## 2025-07-29 NOTE — PATIENT INSTRUCTIONS
Stop all medications except eliquis  Provide fluids and soft pleasure foods  Call with any concerns; can call on-call number ([phone]) if Nella not available

## 2025-09-30 ENCOUNTER — APPOINTMENT (OUTPATIENT)
Dept: OPHTHALMOLOGY | Facility: CLINIC | Age: 85
End: 2025-09-30
Payer: MEDICARE